# Patient Record
Sex: MALE | Race: WHITE | NOT HISPANIC OR LATINO | Employment: OTHER | URBAN - METROPOLITAN AREA
[De-identification: names, ages, dates, MRNs, and addresses within clinical notes are randomized per-mention and may not be internally consistent; named-entity substitution may affect disease eponyms.]

---

## 2019-07-09 ENCOUNTER — HOSPITAL ENCOUNTER (EMERGENCY)
Facility: HOSPITAL | Age: 34
Discharge: HOME | End: 2019-07-09
Attending: EMERGENCY MEDICINE | Admitting: EMERGENCY MEDICINE

## 2019-07-09 ENCOUNTER — APPOINTMENT (OUTPATIENT)
Dept: CT IMAGING | Facility: HOSPITAL | Age: 34
End: 2019-07-09

## 2019-07-09 VITALS
SYSTOLIC BLOOD PRESSURE: 143 MMHG | DIASTOLIC BLOOD PRESSURE: 75 MMHG | OXYGEN SATURATION: 96 % | HEART RATE: 62 BPM | HEIGHT: 67 IN | RESPIRATION RATE: 16 BRPM | TEMPERATURE: 98.3 F

## 2019-07-09 DIAGNOSIS — J36 PERITONSILLAR CELLULITIS: ICD-10-CM

## 2019-07-09 DIAGNOSIS — J03.90 ACUTE TONSILLITIS, UNSPECIFIED ETIOLOGY: Primary | ICD-10-CM

## 2019-07-09 LAB
ALBUMIN SERPL-MCNC: 4.2 G/DL (ref 3.5–5.2)
ALBUMIN/GLOB SERPL: 1.1 G/DL
ALP SERPL-CCNC: 73 U/L (ref 39–117)
ALT SERPL W P-5'-P-CCNC: 24 U/L (ref 1–41)
ANION GAP SERPL CALCULATED.3IONS-SCNC: 12.3 MMOL/L (ref 5–15)
AST SERPL-CCNC: 14 U/L (ref 1–40)
BASOPHILS # BLD AUTO: 0.04 10*3/MM3 (ref 0–0.2)
BASOPHILS NFR BLD AUTO: 0.3 % (ref 0–1.5)
BILIRUB SERPL-MCNC: 0.7 MG/DL (ref 0.2–1.2)
BUN BLD-MCNC: 13 MG/DL (ref 6–20)
BUN/CREAT SERPL: 10.9 (ref 7–25)
CALCIUM SPEC-SCNC: 9.8 MG/DL (ref 8.6–10.5)
CHLORIDE SERPL-SCNC: 100 MMOL/L (ref 98–107)
CO2 SERPL-SCNC: 25.7 MMOL/L (ref 22–29)
CREAT BLD-MCNC: 1.19 MG/DL (ref 0.76–1.27)
DEPRECATED RDW RBC AUTO: 34.7 FL (ref 37–54)
EOSINOPHIL # BLD AUTO: 0.07 10*3/MM3 (ref 0–0.4)
EOSINOPHIL NFR BLD AUTO: 0.4 % (ref 0.3–6.2)
ERYTHROCYTE [DISTWIDTH] IN BLOOD BY AUTOMATED COUNT: 11.3 % (ref 12.3–15.4)
GFR SERPL CREATININE-BSD FRML MDRD: 70 ML/MIN/1.73
GLOBULIN UR ELPH-MCNC: 3.8 GM/DL
GLUCOSE BLD-MCNC: 100 MG/DL (ref 65–99)
HCT VFR BLD AUTO: 42.8 % (ref 37.5–51)
HGB BLD-MCNC: 14.6 G/DL (ref 13–17.7)
IMM GRANULOCYTES # BLD AUTO: 0.11 10*3/MM3 (ref 0–0.05)
IMM GRANULOCYTES NFR BLD AUTO: 0.7 % (ref 0–0.5)
LYMPHOCYTES # BLD AUTO: 2.43 10*3/MM3 (ref 0.7–3.1)
LYMPHOCYTES NFR BLD AUTO: 15.6 % (ref 19.6–45.3)
MCH RBC QN AUTO: 29.3 PG (ref 26.6–33)
MCHC RBC AUTO-ENTMCNC: 34.1 G/DL (ref 31.5–35.7)
MCV RBC AUTO: 85.8 FL (ref 79–97)
MONOCYTES # BLD AUTO: 1.15 10*3/MM3 (ref 0.1–0.9)
MONOCYTES NFR BLD AUTO: 7.4 % (ref 5–12)
NEUTROPHILS # BLD AUTO: 11.76 10*3/MM3 (ref 1.7–7)
NEUTROPHILS NFR BLD AUTO: 75.6 % (ref 42.7–76)
NRBC BLD AUTO-RTO: 0 /100 WBC (ref 0–0.2)
PLATELET # BLD AUTO: 346 10*3/MM3 (ref 140–450)
PMV BLD AUTO: 9.4 FL (ref 6–12)
POTASSIUM BLD-SCNC: 4.3 MMOL/L (ref 3.5–5.2)
PROT SERPL-MCNC: 8 G/DL (ref 6–8.5)
RBC # BLD AUTO: 4.99 10*6/MM3 (ref 4.14–5.8)
SODIUM BLD-SCNC: 138 MMOL/L (ref 136–145)
WBC NRBC COR # BLD: 15.56 10*3/MM3 (ref 3.4–10.8)

## 2019-07-09 PROCEDURE — 96365 THER/PROPH/DIAG IV INF INIT: CPT

## 2019-07-09 PROCEDURE — 85025 COMPLETE CBC W/AUTO DIFF WBC: CPT | Performed by: EMERGENCY MEDICINE

## 2019-07-09 PROCEDURE — 80053 COMPREHEN METABOLIC PANEL: CPT | Performed by: EMERGENCY MEDICINE

## 2019-07-09 PROCEDURE — 25010000002 MORPHINE PER 10 MG: Performed by: EMERGENCY MEDICINE

## 2019-07-09 PROCEDURE — 25010000002 ONDANSETRON PER 1 MG: Performed by: EMERGENCY MEDICINE

## 2019-07-09 PROCEDURE — 96367 TX/PROPH/DG ADDL SEQ IV INF: CPT

## 2019-07-09 PROCEDURE — 96375 TX/PRO/DX INJ NEW DRUG ADDON: CPT

## 2019-07-09 PROCEDURE — 25010000002 IOPAMIDOL 61 % SOLUTION: Performed by: EMERGENCY MEDICINE

## 2019-07-09 PROCEDURE — 70491 CT SOFT TISSUE NECK W/DYE: CPT

## 2019-07-09 PROCEDURE — 25010000002 CEFTRIAXONE PER 250 MG: Performed by: EMERGENCY MEDICINE

## 2019-07-09 PROCEDURE — 25010000002 HYDROMORPHONE PER 4 MG: Performed by: EMERGENCY MEDICINE

## 2019-07-09 PROCEDURE — 99284 EMERGENCY DEPT VISIT MOD MDM: CPT

## 2019-07-09 PROCEDURE — 25010000002 DEXAMETHASONE PER 1 MG: Performed by: EMERGENCY MEDICINE

## 2019-07-09 RX ORDER — ONDANSETRON 2 MG/ML
4 INJECTION INTRAMUSCULAR; INTRAVENOUS ONCE
Status: COMPLETED | OUTPATIENT
Start: 2019-07-09 | End: 2019-07-09

## 2019-07-09 RX ORDER — CLINDAMYCIN HYDROCHLORIDE 300 MG/1
300 CAPSULE ORAL EVERY 6 HOURS
Qty: 40 CAPSULE | Refills: 0 | Status: SHIPPED | OUTPATIENT
Start: 2019-07-09 | End: 2021-04-30

## 2019-07-09 RX ORDER — CLINDAMYCIN PHOSPHATE 600 MG/50ML
600 INJECTION INTRAVENOUS ONCE
Status: COMPLETED | OUTPATIENT
Start: 2019-07-09 | End: 2019-07-09

## 2019-07-09 RX ORDER — SODIUM CHLORIDE 0.9 % (FLUSH) 0.9 %
10 SYRINGE (ML) INJECTION AS NEEDED
Status: DISCONTINUED | OUTPATIENT
Start: 2019-07-09 | End: 2019-07-10 | Stop reason: HOSPADM

## 2019-07-09 RX ORDER — CEFTRIAXONE SODIUM 1 G/50ML
1 INJECTION, SOLUTION INTRAVENOUS ONCE
Status: COMPLETED | OUTPATIENT
Start: 2019-07-09 | End: 2019-07-09

## 2019-07-09 RX ORDER — HYDROMORPHONE HYDROCHLORIDE 1 MG/ML
0.5 INJECTION, SOLUTION INTRAMUSCULAR; INTRAVENOUS; SUBCUTANEOUS ONCE
Status: COMPLETED | OUTPATIENT
Start: 2019-07-09 | End: 2019-07-09

## 2019-07-09 RX ORDER — MORPHINE SULFATE 2 MG/ML
4 INJECTION, SOLUTION INTRAMUSCULAR; INTRAVENOUS ONCE
Status: COMPLETED | OUTPATIENT
Start: 2019-07-09 | End: 2019-07-09

## 2019-07-09 RX ADMIN — CEFTRIAXONE SODIUM 1 G: 1 INJECTION, SOLUTION INTRAVENOUS at 20:12

## 2019-07-09 RX ADMIN — CLINDAMYCIN PHOSPHATE 600 MG: 600 INJECTION, SOLUTION INTRAVENOUS at 22:09

## 2019-07-09 RX ADMIN — DEXAMETHASONE SODIUM PHOSPHATE 10 MG: 10 INJECTION INTRAMUSCULAR; INTRAVENOUS at 20:41

## 2019-07-09 RX ADMIN — SODIUM CHLORIDE 500 ML: 9 INJECTION, SOLUTION INTRAVENOUS at 22:20

## 2019-07-09 RX ADMIN — HYDROMORPHONE HYDROCHLORIDE 0.5 MG: 1 INJECTION, SOLUTION INTRAMUSCULAR; INTRAVENOUS; SUBCUTANEOUS at 21:26

## 2019-07-09 RX ADMIN — ONDANSETRON HYDROCHLORIDE 4 MG: 2 SOLUTION INTRAMUSCULAR; INTRAVENOUS at 20:08

## 2019-07-09 RX ADMIN — MORPHINE SULFATE 4 MG: 2 INJECTION, SOLUTION INTRAMUSCULAR; INTRAVENOUS at 20:10

## 2019-07-09 RX ADMIN — IOPAMIDOL 75 ML: 612 INJECTION, SOLUTION INTRAVENOUS at 21:01

## 2019-07-09 NOTE — ED NOTES
Pt reports sore throat that began Tuesday night and was treated with zpack. Pt sent from [facility] for r/o peritonsillar abscess. Pt reports inc pain and swelling on the L side of his throat and difficulty talking.      Dulce Leung RN  07/09/19 0384

## 2019-07-09 NOTE — ED PROVIDER NOTES
" EMERGENCY DEPARTMENT ENCOUNTER    CHIEF COMPLAINT  Chief Complaint: Sore Throat   History given by: Patient  History limited by: none   Room Number: 01/01  PMD: Provider, No Known      HPI:  Pt is a 33 y.o. male who presents complaining of sore throat for the past 8 days, began as generalized and is now L sided throat pain. Pt states pain is exacerbated with any PO intake, talking, and opening mouth. Pt confirms intermittent fever with tmax of 101.9, loss of appetite, decreased PO intake, and \"muffled\" voice change. Pt states he developed L ear pain today. Pt denies cough and SOA, states he has been able to tolerate secretions. Per pt, upon onset of pain he took 3 days Zpak called in by family, but has not had any improvement. Pt states he has no hx of medical problems and no hx of smoking or drinking.     Duration:  8 days   Onset: gradual   Timing: constant   Location: generalized throat, L sided throat   Radiation: none   Quality: pain   Intensity/Severity: mild to moderate   Progression: worsening   Associated Symptoms: fever, decreased PO intake, loss of appetite, \"muffled\" voice change, ear pain  Aggravating Factors: talking, PO intake, opening mouth   Alleviating Factors: none   Previous Episodes: none   Treatment before arrival: Pt has take 3 day Zpak without improvement.     PAST MEDICAL HISTORY  Active Ambulatory Problems     Diagnosis Date Noted   • No Active Ambulatory Problems     Resolved Ambulatory Problems     Diagnosis Date Noted   • No Resolved Ambulatory Problems     No Additional Past Medical History       PAST SURGICAL HISTORY  No past surgical history on file.    FAMILY HISTORY  No family history on file.    SOCIAL HISTORY  Social History     Socioeconomic History   • Marital status:      Spouse name: Not on file   • Number of children: Not on file   • Years of education: Not on file   • Highest education level: Not on file       ALLERGIES  Patient has no known allergies.    REVIEW OF " "SYSTEMS  Review of Systems   Constitutional: Positive for activity change (decreased PO intake), appetite change (loss of ) and fever (tmax 101.9).   HENT: Positive for ear pain (L ), sore throat (generalized to L side) and voice change (\"muffled\"). Negative for congestion.    Eyes: Negative.    Respiratory: Negative for cough and shortness of breath.    Cardiovascular: Negative for chest pain and leg swelling.   Gastrointestinal: Negative for abdominal pain, diarrhea and vomiting.   Endocrine: Negative.    Genitourinary: Negative for decreased urine volume and dysuria.   Musculoskeletal: Negative for neck pain.   Skin: Negative for rash and wound.   Allergic/Immunologic: Negative.    Neurological: Negative for weakness, numbness and headaches.   Hematological: Negative.    Psychiatric/Behavioral: Negative.    All other systems reviewed and are negative.      PHYSICAL EXAM  ED Triage Vitals   Temp Heart Rate Resp BP SpO2   07/09/19 1909 07/09/19 1909 07/09/19 1909 07/09/19 1920 07/09/19 1909   98.3 °F (36.8 °C) 73 18 136/91 97 %      Temp src Heart Rate Source Patient Position BP Location FiO2 (%)   07/09/19 1909 07/09/19 1909 -- -- --   Tympanic Monitor          Physical Exam   Constitutional: He is oriented to person, place, and time. No distress.   HENT:   Head: Normocephalic and atraumatic.   Mouth/Throat: Uvula is midline. There is trismus in the jaw. Posterior oropharyngeal erythema present.   Bilateral cerumen obstructions. TMs unable to be visualized    L sided tonsillar swelling and edema around tonsil.    Eyes: EOM are normal. Pupils are equal, round, and reactive to light.   Neck: Normal range of motion. Neck supple.   Cardiovascular: Normal rate, regular rhythm and normal heart sounds.   No murmur heard.  Pulmonary/Chest: Effort normal and breath sounds normal. No respiratory distress.   Abdominal: Soft. Bowel sounds are normal. He exhibits no distension. There is no tenderness. There is no rebound and " no guarding.   Musculoskeletal: Normal range of motion. He exhibits no edema.   Lymphadenopathy:     He has cervical adenopathy (bilateral  anterior).   Neurological: He is alert and oriented to person, place, and time. He has normal sensation and normal strength.   Skin: Skin is warm and dry.   Psychiatric: Mood and affect normal.   Nursing note and vitals reviewed.      LAB RESULTS  Lab Results (last 24 hours)     Procedure Component Value Units Date/Time    POCT RAPID STREP A [[id number]]  (Normal) Collected:  07/09/19 1825     Updated:  07/09/19 1949     POC Strep A, Molecular Negative     QC Acceptable     Lot Number [id number]     Expiration Date 3/31/20     Comment --    CBC & Differential [[id number]] Collected:  07/09/19 2004    Specimen:  Blood Updated:  07/09/19 2019    Narrative:       The following orders were created for panel order CBC & Differential.  Procedure                               Abnormality         Status                     ---------                               -----------         ------                     CBC Auto Differential[[id number]]        Abnormal            Final result                 Please view results for these tests on the individual orders.    Comprehensive Metabolic Panel [[id number]]  (Abnormal) Collected:  07/09/19 2004    Specimen:  Blood Updated:  07/09/19 2035     Glucose 100 mg/dL      BUN 13 mg/dL      Creatinine 1.19 mg/dL      Sodium 138 mmol/L      Potassium 4.3 mmol/L      Chloride 100 mmol/L      CO2 25.7 mmol/L      Calcium 9.8 mg/dL      Total Protein 8.0 g/dL      Albumin 4.20 g/dL      ALT (SGPT) 24 U/L      AST (SGOT) 14 U/L      Alkaline Phosphatase 73 U/L      Total Bilirubin 0.7 mg/dL      eGFR Non African Amer 70 mL/min/1.73      Globulin 3.8 gm/dL      A/G Ratio 1.1 g/dL      BUN/Creatinine Ratio 10.9     Anion Gap 12.3 mmol/L     Narrative:       GFR Normal >60  Chronic Kidney Disease <60  Kidney Failure <15    CBC Auto Differential [[id number]]   (Abnormal) Collected:  07/09/19 2004    Specimen:  Blood Updated:  07/09/19 2019     WBC 15.56 10*3/mm3      RBC 4.99 10*6/mm3      Hemoglobin 14.6 g/dL      Hematocrit 42.8 %      MCV 85.8 fL      MCH 29.3 pg      MCHC 34.1 g/dL      RDW 11.3 %      RDW-SD 34.7 fl      MPV 9.4 fL      Platelets 346 10*3/mm3      Neutrophil % 75.6 %      Lymphocyte % 15.6 %      Monocyte % 7.4 %      Eosinophil % 0.4 %      Basophil % 0.3 %      Immature Grans % 0.7 %      Neutrophils, Absolute 11.76 10*3/mm3      Lymphocytes, Absolute 2.43 10*3/mm3      Monocytes, Absolute 1.15 10*3/mm3      Eosinophils, Absolute 0.07 10*3/mm3      Basophils, Absolute 0.04 10*3/mm3      Immature Grans, Absolute 0.11 10*3/mm3      nRBC 0.0 /100 WBC           I ordered the above labs and reviewed the results    RADIOLOGY  CT Soft Tissue Neck With Contrast   Final Result   1. Left palatine tonsillar enlargement with subtle area of diminished   attenuation centrally felt to reflect inflammatory phlegmon at this time   without convincing evidence of a well-formed abscess.   2. Mild right jugulodigastric adenopathy favored to be reactive               This report was finalized on 7/9/2019 9:21 PM by Viet Palma M.D.               I ordered the above noted radiological studies. Interpreted by radiologist. Reviewed by me in PACS.       PROCEDURES  Procedures      PROGRESS AND CONSULTS     1958: Upon pt exam, discussed plan for labs and CT neck for further evaluation and rule out of abscess. Informed pt of plan for abx, pain medication, and steroids in ED.     2001: Labs and CT soft tissue neck ordered for further evaluation. Zofran, decadron, rocephin, and morphine ordered for management of symptoms.     2122: Dilaudid ordered for further pain management.     2135: Call placed to ENT.     2137: Pt rechecked and resting comfortably. Discussed results of labs and CT scan with evidence of elevated WBC and evidence of phlegmon seen on CT. Informed pt of  plan for discussion with ENT for further plan for care, with probable plan for discharge with further abx treatment. Discussed pt's Penicillin allergy at length, with plan for Clindamycin and pain medication upon discharge.     2156: Discussed pt's case with Dr. Magdaleno (ENT) who recommended giving pt dosage of Clindamycin in ED prior to discharge with further Clindamycin for outpatient follow up in his office tomorrow.     2157: Clindamycin ordered for dosage in ED prior to discharge.     2205: Pt rechecked and resting comfortably. Discussed call with Dr. Magdaleno and plan for discharge following dosage of Clindamycin in ED, with plan for outpatient office follow up at ENT tomorrow at 1300. Agreed to give pt further abx and pain medication with discharge as prior discussed. Pt understands and agrees with the plan, all questions answered.      MEDICAL DECISION MAKING  Results were reviewed/discussed with the patient and they were also made aware of online access. Pt also made aware that some labs, such as cultures, will not be resulted during ER visit and follow up with PMD is necessary.     MDM  Number of Diagnoses or Management Options     Amount and/or Complexity of Data Reviewed  Clinical lab tests: ordered and reviewed (WBC -  15.56)  Tests in the radiology section of CPT®: ordered and reviewed (CT- L palatine tonsillar enlargement with phlegmon )  Review and summarize past medical records: yes (7/9/19 - seen by Dr. Rodriguez (PCP) and referred to ED for abscess rule out )  Discuss the patient with other providers: yes (Dr. Magdaleno (ENT))           DIAGNOSIS  Final diagnoses:   Acute tonsillitis, unspecified etiology   Peritonsillar cellulitis       DISPOSITION  DISCHARGE    Patient discharged in stable condition.    Reviewed implications of results, diagnosis, meds, responsibility to follow up, warning signs and symptoms of possible worsening, potential complications and reasons to return to ER.    Patient/Family  voiced understanding of above instructions.    Discussed plan for discharge, as there is no emergent indication for admission. Patient referred to primary care provider for BP management due to today's BP. Pt/family is agreeable and understands need for follow up and repeat testing.  Pt is aware that discharge does not mean that nothing is wrong but it indicates no emergency is present that requires admission and they must continue care with follow-up as given below or physician of their choice.     FOLLOW-UP  Salomon Magdaleno MD  [address] [address]  [phone]    In 1 day           Medication List      New Prescriptions    clindamycin 300 MG capsule  Commonly known as:  CLEOCIN  Take 1 capsule by mouth Every 6 (Six) Hours.     HYDROcodone-acetaminophen 7.5-325 MG/15ML solution  Commonly known as:  HYCET  Take 15 mL by mouth Every 6 (Six) Hours As Needed for Moderate Pain .              Latest Documented Vital Signs:  As of 10:49 PM  BP- 143/75 HR- 62 Temp- 98.3 °F (36.8 °C) (Tympanic) O2 sat- 96%    --  Documentation assistance provided by tasia Solomon for Dr. Hanson.  Information recorded by the scribe was done at my direction and has been verified and validated by me.          Vanda Solomon  07/09/19 1749       Sid Hanson MD  07/09/19 2799

## 2019-07-09 NOTE — ED NOTES
"Pt reports sore throat began last week. He began taking Zpack with no improvement. Pt now having L sided swelling of neck and L ear pain that began today. Pt reports increased pain with swallowing but states he can do it but \"would rather spit out\"       Elodia Dangelo, RN  07/09/19 1956    "

## 2021-04-30 ENCOUNTER — OFFICE VISIT (OUTPATIENT)
Dept: FAMILY MEDICINE CLINIC | Facility: CLINIC | Age: 36
End: 2021-04-30

## 2021-04-30 VITALS
BODY MASS INDEX: 34.97 KG/M2 | SYSTOLIC BLOOD PRESSURE: 123 MMHG | OXYGEN SATURATION: 95 % | TEMPERATURE: 98.7 F | DIASTOLIC BLOOD PRESSURE: 74 MMHG | WEIGHT: 222.8 LBS | HEART RATE: 60 BPM | HEIGHT: 67 IN

## 2021-04-30 DIAGNOSIS — Z00.00 HEALTH MAINTENANCE EXAMINATION: Primary | ICD-10-CM

## 2021-04-30 DIAGNOSIS — R53.83 FATIGUE, UNSPECIFIED TYPE: ICD-10-CM

## 2021-04-30 DIAGNOSIS — G47.00 INSOMNIA, UNSPECIFIED TYPE: ICD-10-CM

## 2021-04-30 PROCEDURE — 99385 PREV VISIT NEW AGE 18-39: CPT | Performed by: NURSE PRACTITIONER

## 2021-04-30 PROCEDURE — 3008F BODY MASS INDEX DOCD: CPT | Performed by: NURSE PRACTITIONER

## 2021-04-30 RX ORDER — AMITRIPTYLINE HYDROCHLORIDE 25 MG/1
25 TABLET, FILM COATED ORAL NIGHTLY PRN
Qty: 30 TABLET | Refills: 1 | Status: SHIPPED | OUTPATIENT
Start: 2021-04-30 | End: 2022-04-29

## 2021-05-03 LAB
25(OH)D3+25(OH)D2 SERPL-MCNC: 32 NG/ML (ref 30–100)
BASOPHILS # BLD AUTO: 0.02 10*3/MM3 (ref 0–0.2)
BASOPHILS NFR BLD AUTO: 0.3 % (ref 0–1.5)
EOSINOPHIL # BLD AUTO: 0.09 10*3/MM3 (ref 0–0.4)
EOSINOPHIL NFR BLD AUTO: 1.4 % (ref 0.3–6.2)
ERYTHROCYTE [DISTWIDTH] IN BLOOD BY AUTOMATED COUNT: 11.7 % (ref 12.3–15.4)
HCT VFR BLD AUTO: 42.1 % (ref 37.5–51)
HGB BLD-MCNC: 14.5 G/DL (ref 13–17.7)
IMM GRANULOCYTES # BLD AUTO: 0.02 10*3/MM3 (ref 0–0.05)
IMM GRANULOCYTES NFR BLD AUTO: 0.3 % (ref 0–0.5)
LYMPHOCYTES # BLD AUTO: 2.27 10*3/MM3 (ref 0.7–3.1)
LYMPHOCYTES NFR BLD AUTO: 36.4 % (ref 19.6–45.3)
MCH RBC QN AUTO: 30 PG (ref 26.6–33)
MCHC RBC AUTO-ENTMCNC: 34.4 G/DL (ref 31.5–35.7)
MCV RBC AUTO: 87 FL (ref 79–97)
MONOCYTES # BLD AUTO: 0.36 10*3/MM3 (ref 0.1–0.9)
MONOCYTES NFR BLD AUTO: 5.8 % (ref 5–12)
NEUTROPHILS # BLD AUTO: 3.48 10*3/MM3 (ref 1.7–7)
NEUTROPHILS NFR BLD AUTO: 55.8 % (ref 42.7–76)
NRBC BLD AUTO-RTO: 0 /100 WBC (ref 0–0.2)
PLATELET # BLD AUTO: 241 10*3/MM3 (ref 140–450)
RBC # BLD AUTO: 4.84 10*6/MM3 (ref 4.14–5.8)
TESTOST FREE SERPL-MCNC: 6.4 PG/ML (ref 8.7–25.1)
TESTOST SERPL-MCNC: 447 NG/DL (ref 264–916)
TSH SERPL DL<=0.005 MIU/L-ACNC: 1.57 UIU/ML (ref 0.27–4.2)
WBC # BLD AUTO: 6.24 10*3/MM3 (ref 3.4–10.8)

## 2021-05-13 ENCOUNTER — TELEPHONE (OUTPATIENT)
Dept: FAMILY MEDICINE CLINIC | Facility: CLINIC | Age: 36
End: 2021-05-13

## 2021-05-13 NOTE — TELEPHONE ENCOUNTER
OK for HUB to read and schedule:    LMTCB-  Your provider will be out of the office 05/14/2021. Please call office to reschedule your appointment that is on 05/14/21

## 2022-04-01 ENCOUNTER — APPOINTMENT (OUTPATIENT)
Dept: CARDIOLOGY | Facility: HOSPITAL | Age: 37
End: 2022-04-01

## 2022-04-01 ENCOUNTER — HOSPITAL ENCOUNTER (EMERGENCY)
Facility: HOSPITAL | Age: 37
Discharge: HOME | End: 2022-04-01
Attending: EMERGENCY MEDICINE | Admitting: EMERGENCY MEDICINE

## 2022-04-01 VITALS
DIASTOLIC BLOOD PRESSURE: 83 MMHG | HEIGHT: 67 IN | RESPIRATION RATE: 16 BRPM | SYSTOLIC BLOOD PRESSURE: 135 MMHG | WEIGHT: 270 LBS | OXYGEN SATURATION: 98 % | BODY MASS INDEX: 42.38 KG/M2 | HEART RATE: 51 BPM | TEMPERATURE: 97.2 F

## 2022-04-01 DIAGNOSIS — I82.462 ACUTE DEEP VEIN THROMBOSIS (DVT) OF CALF MUSCLE VEIN OF LEFT LOWER EXTREMITY: Primary | ICD-10-CM

## 2022-04-01 LAB
BH CV LOW VAS LEFT GASTRONEMIUS VESSEL: 1
BH CV LOW VAS LEFT POSTERIOR TIBIAL VESSEL: 1
BH CV LOW VAS LEFT SOLEAL VESSEL: 1
BH CV LOWER VASCULAR LEFT COMMON FEMORAL AUGMENT: NORMAL
BH CV LOWER VASCULAR LEFT COMMON FEMORAL COMPETENT: NORMAL
BH CV LOWER VASCULAR LEFT COMMON FEMORAL COMPRESS: NORMAL
BH CV LOWER VASCULAR LEFT COMMON FEMORAL PHASIC: NORMAL
BH CV LOWER VASCULAR LEFT COMMON FEMORAL SPONT: NORMAL
BH CV LOWER VASCULAR LEFT DISTAL FEMORAL COMPRESS: NORMAL
BH CV LOWER VASCULAR LEFT GASTRONEMIUS COMPRESS: NORMAL
BH CV LOWER VASCULAR LEFT GASTRONEMIUS THROMBUS: NORMAL
BH CV LOWER VASCULAR LEFT GREATER SAPH AK COMPRESS: NORMAL
BH CV LOWER VASCULAR LEFT GREATER SAPH BK COMPRESS: NORMAL
BH CV LOWER VASCULAR LEFT LESSER SAPH COMPRESS: NORMAL
BH CV LOWER VASCULAR LEFT MID FEMORAL AUGMENT: NORMAL
BH CV LOWER VASCULAR LEFT MID FEMORAL COMPETENT: NORMAL
BH CV LOWER VASCULAR LEFT MID FEMORAL COMPRESS: NORMAL
BH CV LOWER VASCULAR LEFT MID FEMORAL PHASIC: NORMAL
BH CV LOWER VASCULAR LEFT MID FEMORAL SPONT: NORMAL
BH CV LOWER VASCULAR LEFT PERONEAL COMPRESS: NORMAL
BH CV LOWER VASCULAR LEFT POPLITEAL AUGMENT: NORMAL
BH CV LOWER VASCULAR LEFT POPLITEAL COMPETENT: NORMAL
BH CV LOWER VASCULAR LEFT POPLITEAL COMPRESS: NORMAL
BH CV LOWER VASCULAR LEFT POPLITEAL PHASIC: NORMAL
BH CV LOWER VASCULAR LEFT POPLITEAL SPONT: NORMAL
BH CV LOWER VASCULAR LEFT POSTERIOR TIBIAL COMPRESS: NORMAL
BH CV LOWER VASCULAR LEFT POSTERIOR TIBIAL THROMBUS: NORMAL
BH CV LOWER VASCULAR LEFT PROFUNDA FEMORAL COMPRESS: NORMAL
BH CV LOWER VASCULAR LEFT PROXIMAL FEMORAL COMPRESS: NORMAL
BH CV LOWER VASCULAR LEFT SAPHENOFEMORAL JUNCTION COMPRESS: NORMAL
BH CV LOWER VASCULAR LEFT SOLEAL COMPRESS: NORMAL
BH CV LOWER VASCULAR LEFT SOLEAL THROMBUS: NORMAL
BH CV LOWER VASCULAR RIGHT COMMON FEMORAL AUGMENT: NORMAL
BH CV LOWER VASCULAR RIGHT COMMON FEMORAL COMPETENT: NORMAL
BH CV LOWER VASCULAR RIGHT COMMON FEMORAL COMPRESS: NORMAL
BH CV LOWER VASCULAR RIGHT COMMON FEMORAL PHASIC: NORMAL
BH CV LOWER VASCULAR RIGHT COMMON FEMORAL SPONT: NORMAL
MAXIMAL PREDICTED HEART RATE: 184 BPM
STRESS TARGET HR: 156 BPM

## 2022-04-01 PROCEDURE — 99282 EMERGENCY DEPT VISIT SF MDM: CPT

## 2022-04-01 PROCEDURE — 93971 EXTREMITY STUDY: CPT

## 2022-04-01 NOTE — ED PROVIDER NOTES
MD ATTESTATION NOTE    The BERTHA and I have discussed this patient's history, physical exam, and treatment plan.  I have reviewed the documentation and personally had a face to face interaction with the patient. I affirm the documentation and agree with the treatment and plan.  The attached note describes my personal findings.    I provided a substantive portion of the care of this patient. I personally performed the physical exam, in its entirety.    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  He did have recent car ride to [address] but denies any other injury.  Denies chest pain or shortness of breath.    Physical Exam  Vital Signs reviewed  GENERAL: Alert male no obvious distress  HENT: nares patent  EYES: no scleral icterus  CV: regular rhythm, regular rate  RESPIRATORY: normal effort, clear to auscultation bilaterally  ABDOMEN: soft, nontender  MUSCULOSKELETAL: no deformity-mild tenderness palpation of left calf-no significant swelling noted  NEURO: Strength sensation and coordination are grossly intact.  Speech and mentation are unremarkable  SKIN: warm, dry      Disposition  I discussed treatment and evaluation this patient with JESUS Osuna.  Doppler ultrasound unfortunately did show calf vein DVT.  We will go ahead and initiate anticoagulation with oral Eliquis.  Patient did have blood work within the last year that was pretty normal including normal hemoglobin and renal function.  I see no indication to repeat labs at this time.  Will give patient referral to either primary care provider or hematology for further work-up as outpatient.       Sami Swain MD  04/01/22 3009

## 2022-04-01 NOTE — PROGRESS NOTES
Today's preliminary report. Left lower extremity venous Doppler is positive for acute calf DVT. Report called to JESUS Winn

## 2022-04-01 NOTE — ED PROVIDER NOTES
EMERGENCY DEPARTMENT ENCOUNTER    Room Number:  B01/01  Date of encounter:  4/1/2022  PCP: Robyn Nevarez APRN  Historian: Patient      I used full protective equipment while examining this patient.  This includes face mask, gloves and protective eyewear.  I washed my hands before entering the room and immediately upon leaving the room      HPI:  Chief Complaint: Left calf pain  A complete HPI/ROS/PMH/PSH/SH/FH are unobtainable due to: Nothing    Context: Ruddy Luu is a 36 y.o. male who presents to the ED c/o gradual onset left calf pain yesterday.  Patient describes it as an achy, sharp sensation at the mid posterior left calf.  He denies any known trauma.  He did have a 4-hour car ride to [place] 2 days ago and returned via car yesterday.  He denies any chest pain, shortness of breath.  Denies any prior history of DVTs.  Denies any hormone therapy.  He states that ambulating worsens the pain.  He denies any numbness distally in his foot.    Review of Medical Records  I reviewed patient's last family medicine office visit from 4/30/2021.  Patient being followed for general health maintenance.    PAST MEDICAL HISTORY  Active Ambulatory Problems     Diagnosis Date Noted   • No Active Ambulatory Problems     Resolved Ambulatory Problems     Diagnosis Date Noted   • No Resolved Ambulatory Problems     No Additional Past Medical History         PAST SURGICAL HISTORY  History reviewed. No pertinent surgical history.      FAMILY HISTORY  History reviewed. No pertinent family history.      SOCIAL HISTORY  Social History     Socioeconomic History   • Marital status:    Tobacco Use   • Smoking status: Never Smoker   • Smokeless tobacco: Never Used   Substance and Sexual Activity   • Alcohol use: Yes     Comment: rarely   • Drug use: Never         ALLERGIES  Penicillins        REVIEW OF SYSTEMS  All systems reviewed and negative except for those discussed in HPI.       PHYSICAL EXAM    I have reviewed the  triage vital signs and nursing notes.    ED Triage Vitals   Temp Heart Rate Resp BP SpO2   04/01/22 1144 04/01/22 1144 04/01/22 1144 04/01/22 1159 04/01/22 1144   97.2 °F (36.2 °C) 69 18 142/75 96 %      Temp src Heart Rate Source Patient Position BP Location FiO2 (%)   04/01/22 1144 04/01/22 1144 04/01/22 1159 04/01/22 1159 --   Tympanic Monitor Sitting Left arm        Physical Exam  GENERAL: Alert, oriented, not distressed  HENT: head atraumatic, no nuchal rigidity  EYES: no scleral icterus, EOMI  CV: regular rhythm, regular rate, no murmur  RESPIRATORY: normal effort, CTA  ABDOMEN: soft, nontender  MUSCULOSKELETAL: Mild diffuse tenderness to posterior left calf.  No palpable cords.  Positive Homans' sign.  Compartments are soft.  Neurovascularly intact distally.  NEURO: alert, moves all extremities, follows commands  SKIN: warm, dry        LAB RESULTS  Recent Results (from the past 24 hour(s))   Duplex Venous Lower Extremity - Left CAR    Collection Time: 04/01/22  1:35 PM   Result Value Ref Range    Target HR (85%) 156 bpm    Max. Pred. HR (100%) 184 bpm    Left Posterior Tibial Vessel 1     Left Gastronemius Vessel 1     Right Common Femoral Spont Y     Right Common Femoral Phasic Y     Right Common Femoral Augment Y     Right Common Femoral Competent Y     Right Common Femoral Compress C     Left Posterior Tibial Compress N     Left Posterior Tibial Thrombus A     Left Gastronemius Compress N     Left Gastronemius Thrombus A     Lt soleal vessel 1     Lt soleal compress N     Lt soleal thrombus A     Left Common Femoral Spont Y     Left Common Femoral Phasic Y     Left Common Femoral Augment Y     Left Common Femoral Competent Y     Left Common Femoral Compress C     Left Saphenofemoral Junction Compress C     Left Profunda Femoral Compress C     Left Proximal Femoral Compress C     Left Mid Femoral Spont Y     Left Mid Femoral Phasic Y     Left Mid Femoral Augment Y     Left Mid Femoral Competent Y     Left  Mid Femoral Compress C     Left Distal Femoral Compress C     Left Popliteal Spont Y     Left Popliteal Phasic Y     Left Popliteal Augment Y     Left Popliteal Competent Y     Left Popliteal Compress C     Left Peroneal Compress C     Left Greater Saph AK Compress C     Left Greater Saph BK Compress C     Left Lesser Saph Compress C        Ordered the above labs and independently reviewed the results.        RADIOLOGY  Duplex Venous Lower Extremity - Left CAR    Addendum Date: 4/1/2022    · Acute left lower extremity deep vein thrombosis noted in the posterial tibial, gastrocnemius and soleal. · All other left sided veins appeared normal.      Result Date: 4/1/2022  · Acute left lower extremity deep vein thrombosis noted in the posterial tibial and gastrocnemius. · Acute left lower extremity superficial thrombophlebitis noted in the great saphenous (above knee). · All other left sided veins appeared normal.        I ordered the above noted radiological studies. Reviewed by me and discussed with radiologist.  See dictation for official radiology interpretation.      MEDICATIONS GIVEN IN ER    Medications - No data to display      PROGRESS, DATA ANALYSIS, CONSULTS, AND MEDICAL DECISION MAKING    All labs have been independently reviewed by me.  All radiology studies have been reviewed by me and discussed with radiologist dictating the report.   EKG's independently viewed and interpreted by me.  Discussion below represents my analysis of pertinent findings related to patient's condition, differential diagnosis, treatment plan and final disposition.    I have discussed case with Dr. Swain, emergency room physician.  He has performed his own bedside examination and agrees with treatment plan.    ED Course as of 04/01/22 1851 Fri Apr 01, 2022   1207 Patient presents with 1 day history of left calf pain and swelling.  Differential diagnoses include but not limited to muscular strain, DVT, superficial thrombophlebitis.  [EE]   1346 I discussed ultrasound findings with technician.  Patient does have acute calf vein DVT.      He denies any chest pain, shortness of breath he has stable vitals.  No evidence of PE.  Plan to start the patient on Eliquis. [EE]      ED Course User Index  [EE] Lico Osuna PA       AS OF 18:51 EDT VITALS:    BP - 135/83  HR - 51  TEMP - 97.2 °F (36.2 °C) (Tympanic)  O2 SATS - 98%        DIAGNOSIS  Final diagnoses:   Acute deep vein thrombosis (DVT) of calf muscle vein of left lower extremity (HCC)         DISPOSITION  Discharged      Dictated utilizing Dragon dictation     Lico Osuna PA  04/01/22 0543

## 2022-04-04 ENCOUNTER — TELEPHONE (OUTPATIENT)
Dept: FAMILY MEDICINE CLINIC | Facility: CLINIC | Age: 37
End: 2022-04-04

## 2022-04-04 ENCOUNTER — TELEPHONE (OUTPATIENT)
Dept: ONCOLOGY | Facility: CLINIC | Age: 37
End: 2022-04-04

## 2022-04-04 NOTE — TELEPHONE ENCOUNTER
Pt is new tomorrow, was in ER over the weekend with blood clot.  He is in a lot of pain and can't walk on that leg.  Wants to make sure it's ok to wait till tomorrow or should he go back to ER.  He has been on Eliquis for 2 days.

## 2022-04-04 NOTE — TELEPHONE ENCOUNTER
Caller: jonh ralph    Relationship: Emergency Contact    Best call back number: [phone]    What is the best time to reach you: ANYTIME    Who are you requesting to speak with (clinical staff, provider,  specific staff member): DR KANG OR NURSE    What was the call regarding: JONH CALLED - STATED PT WAS SEEN IN THE ER OVER THE WEEKEND FOR A BLOOD CLOT. HE HAS BEEN STARTED ON ELIQUIS. HE IS STILL HAVING PAIN AND SWELLING AND THEY WERE WANTING HIM TO BE SEEN ASAP. THE ER TOLD THEM TO F/U WITH OUR OFFICE.    PLEASE CALL ASAP TO ADVISE.    SPOKE WITH GABINO ON CLINICAL LINE AND SHE SAID SEND OVER URGENT ENCOUNTER.     Do you require a callback: YES

## 2022-04-04 NOTE — TELEPHONE ENCOUNTER
Returned call to wife who is reporting that patient is experiencing severe pain in his leg where the clot is.  There is swelling, pain and redness still in leg.  If he puts weight on his foot to walk he has severe pain.  He has a new patient appointment with [facility] at 0840 in the am.  I asked wife if the leg has increased in size or is discolored.  She reports redness only.  I asked her if he has a pulse in his foot, but she is not home to check it at this time.  I explained that if worsens he will need to go back to the ER.

## 2022-04-04 NOTE — TELEPHONE ENCOUNTER
Returned call to patient's wife who is calling to make an urgent appointment with Hematology/oncology.  See ER referral.  He is supposed to be seen this week.  I will send to  to contact wife for appointment.  He will continue to elevate and take his Eliquis as ordered.

## 2022-04-05 ENCOUNTER — LAB (OUTPATIENT)
Dept: OTHER | Facility: HOSPITAL | Age: 37
End: 2022-04-05

## 2022-04-05 ENCOUNTER — CONSULT (OUTPATIENT)
Dept: ONCOLOGY | Facility: CLINIC | Age: 37
End: 2022-04-05

## 2022-04-05 VITALS
TEMPERATURE: 97.5 F | RESPIRATION RATE: 16 BRPM | BODY MASS INDEX: 41.64 KG/M2 | HEART RATE: 61 BPM | DIASTOLIC BLOOD PRESSURE: 72 MMHG | WEIGHT: 265.3 LBS | OXYGEN SATURATION: 96 % | SYSTOLIC BLOOD PRESSURE: 128 MMHG | HEIGHT: 67 IN

## 2022-04-05 DIAGNOSIS — I82.462 ACUTE DEEP VEIN THROMBOSIS (DVT) OF CALF MUSCLE VEIN OF LEFT LOWER EXTREMITY: Primary | ICD-10-CM

## 2022-04-05 DIAGNOSIS — Z86.718 H/O BLOOD CLOTS: Primary | ICD-10-CM

## 2022-04-05 DIAGNOSIS — I82.4Y9 ACUTE DEEP VEIN THROMBOSIS (DVT) OF PROXIMAL VEIN OF LOWER EXTREMITY, UNSPECIFIED LATERALITY: Primary | ICD-10-CM

## 2022-04-05 LAB
ALBUMIN SERPL-MCNC: 4.5 G/DL (ref 3.5–5.2)
ALBUMIN/GLOB SERPL: 1.5 G/DL
ALP SERPL-CCNC: 84 U/L (ref 39–117)
ALT SERPL W P-5'-P-CCNC: 48 U/L (ref 1–41)
ANION GAP SERPL CALCULATED.3IONS-SCNC: 9.5 MMOL/L (ref 5–15)
AST SERPL-CCNC: 24 U/L (ref 1–40)
BASOPHILS # BLD AUTO: 0.04 10*3/MM3 (ref 0–0.2)
BASOPHILS NFR BLD AUTO: 0.5 % (ref 0–1.5)
BILIRUB SERPL-MCNC: 0.3 MG/DL (ref 0–1.2)
BUN SERPL-MCNC: 15 MG/DL (ref 6–20)
BUN/CREAT SERPL: 12.6 (ref 7–25)
CALCIUM SPEC-SCNC: 10.2 MG/DL (ref 8.6–10.5)
CHLORIDE SERPL-SCNC: 103 MMOL/L (ref 98–107)
CO2 SERPL-SCNC: 24.5 MMOL/L (ref 22–29)
CREAT SERPL-MCNC: 1.19 MG/DL (ref 0.76–1.27)
DEPRECATED RDW RBC AUTO: 35.6 FL (ref 37–54)
EGFRCR SERPLBLD CKD-EPI 2021: 81.2 ML/MIN/1.73
EOSINOPHIL # BLD AUTO: 0.21 10*3/MM3 (ref 0–0.4)
EOSINOPHIL NFR BLD AUTO: 2.5 % (ref 0.3–6.2)
ERYTHROCYTE [DISTWIDTH] IN BLOOD BY AUTOMATED COUNT: 11.9 % (ref 12.3–15.4)
GLOBULIN UR ELPH-MCNC: 3 GM/DL
GLUCOSE SERPL-MCNC: 95 MG/DL (ref 65–99)
HCT VFR BLD AUTO: 43.2 % (ref 37.5–51)
HGB BLD-MCNC: 15.2 G/DL (ref 13–17.7)
IMM GRANULOCYTES # BLD AUTO: 0.04 10*3/MM3 (ref 0–0.05)
IMM GRANULOCYTES NFR BLD AUTO: 0.5 % (ref 0–0.5)
LYMPHOCYTES # BLD AUTO: 2.43 10*3/MM3 (ref 0.7–3.1)
LYMPHOCYTES NFR BLD AUTO: 28.5 % (ref 19.6–45.3)
MCH RBC QN AUTO: 29.2 PG (ref 26.6–33)
MCHC RBC AUTO-ENTMCNC: 35.2 G/DL (ref 31.5–35.7)
MCV RBC AUTO: 82.9 FL (ref 79–97)
MONOCYTES # BLD AUTO: 0.46 10*3/MM3 (ref 0.1–0.9)
MONOCYTES NFR BLD AUTO: 5.4 % (ref 5–12)
NEUTROPHILS NFR BLD AUTO: 5.34 10*3/MM3 (ref 1.7–7)
NEUTROPHILS NFR BLD AUTO: 62.6 % (ref 42.7–76)
NRBC BLD AUTO-RTO: 0 /100 WBC (ref 0–0.2)
PLATELET # BLD AUTO: 274 10*3/MM3 (ref 140–450)
PMV BLD AUTO: 10.1 FL (ref 6–12)
POTASSIUM SERPL-SCNC: 4.2 MMOL/L (ref 3.5–5.2)
PROT SERPL-MCNC: 7.5 G/DL (ref 6–8.5)
RBC # BLD AUTO: 5.21 10*6/MM3 (ref 4.14–5.8)
SODIUM SERPL-SCNC: 137 MMOL/L (ref 136–145)
WBC NRBC COR # BLD: 8.52 10*3/MM3 (ref 3.4–10.8)

## 2022-04-05 PROCEDURE — 85732 THROMBOPLASTIN TIME PARTIAL: CPT | Performed by: INTERNAL MEDICINE

## 2022-04-05 PROCEDURE — 85303 CLOT INHIBIT PROT C ACTIVITY: CPT | Performed by: INTERNAL MEDICINE

## 2022-04-05 PROCEDURE — 86146 BETA-2 GLYCOPROTEIN ANTIBODY: CPT | Performed by: INTERNAL MEDICINE

## 2022-04-05 PROCEDURE — 36415 COLL VENOUS BLD VENIPUNCTURE: CPT

## 2022-04-05 PROCEDURE — 80053 COMPREHEN METABOLIC PANEL: CPT | Performed by: INTERNAL MEDICINE

## 2022-04-05 PROCEDURE — 85306 CLOT INHIBIT PROT S FREE: CPT | Performed by: INTERNAL MEDICINE

## 2022-04-05 PROCEDURE — 85300 ANTITHROMBIN III ACTIVITY: CPT | Performed by: INTERNAL MEDICINE

## 2022-04-05 PROCEDURE — 99204 OFFICE O/P NEW MOD 45 MIN: CPT | Performed by: INTERNAL MEDICINE

## 2022-04-05 PROCEDURE — 85613 RUSSELL VIPER VENOM DILUTED: CPT | Performed by: INTERNAL MEDICINE

## 2022-04-05 PROCEDURE — 81240 F2 GENE: CPT | Performed by: INTERNAL MEDICINE

## 2022-04-05 PROCEDURE — 85025 COMPLETE CBC W/AUTO DIFF WBC: CPT | Performed by: INTERNAL MEDICINE

## 2022-04-05 PROCEDURE — 86147 CARDIOLIPIN ANTIBODY EA IG: CPT | Performed by: INTERNAL MEDICINE

## 2022-04-05 PROCEDURE — 81241 F5 GENE: CPT | Performed by: INTERNAL MEDICINE

## 2022-04-05 RX ORDER — BUPROPION HYDROCHLORIDE 300 MG/1
TABLET ORAL
COMMUNITY
Start: 2021-12-01 | End: 2022-04-29 | Stop reason: SDUPTHER

## 2022-04-05 RX ORDER — BUPROPION HYDROCHLORIDE 300 MG/1
TABLET ORAL
COMMUNITY
Start: 2022-04-02

## 2022-04-05 NOTE — PROGRESS NOTES
Subjective     REASON FOR CONSULTATION: Left lower extremity calf vein DVT  Provide an opinion on any further workup or treatment                             REQUESTING PHYSICIAN: SHIRA Montalvo    RECORDS OBTAINED:  Records of the patients history including those obtained from the referring provider were reviewed and summarized in detail.    HISTORY OF PRESENT ILLNESS:  The patient is a 36 y.o. year old male who is here for an opinion about the above issue.  He is a relatively healthy young man with no chronic medical problems.  He presented to the emergency room on 4/1/2022 with pain in the left lower extremity.  He underwent a venous Doppler study acute DVT in the posterior tibial, gastrocnemius, and soleal veins.  He was started on Eliquis 10 mg p.o. twice daily x7 days to be followed by 5 mg twice daily thereafter.    He is here today for his first visit to our office and to undergo a hypercoagulable work-up to try to explain the development of this clot.  He does have a family history of blood clots in his mother who had DVTs.  He has not had any significant injury or surgery on the leg.  He had recently traveled to [place] in a car but had gotten out every couple of hours.  He is not on any hormonal therapies.  He has no history of malignancy.    He is not having any signs or symptoms of pulmonary embolus.  He reports the leg is still sore and swollen although it is better since starting Eliquis.  I believe he needs to have some compression stockings along with his anticoagulation.    History of Present Illness     Past Medical History:   Diagnosis Date   • History of DVT (deep vein thrombosis) 04/2022    Left lower extremity, calf muscle vein   • No pertinent past medical history         No past surgical history on file.     Current Outpatient Medications on File Prior to Visit   Medication Sig Dispense Refill   • Apixaban Starter Pack tablet therapy pack Take two 5 mg tablets by mouth every 12 hours  "for 7 days. Followed by one 5 mg tablet every 12 hours. (Dispense starter pack if available) 74 tablet 0   • buPROPion XL (WELLBUTRIN XL) 300 MG 24 hr tablet      • buPROPion XL (WELLBUTRIN XL) 300 MG 24 hr tablet      • amitriptyline (ELAVIL) 25 MG tablet Take 1 tablet by mouth At Night As Needed for Sleep. 30 tablet 1     No current facility-administered medications on file prior to visit.        ALLERGIES:    Allergies   Allergen Reactions   • Penicillins Other (See Comments)     Unknown, allergic as a child        Social History     Socioeconomic History   • Marital status:    Tobacco Use   • Smoking status: Never Smoker   • Smokeless tobacco: Never Used   Substance and Sexual Activity   • Alcohol use: Yes     Comment: rarely   • Drug use: Never        No family history on file.     Review of Systems   Constitutional: Positive for activity change. Negative for chills, fatigue and fever.   HENT: Negative for mouth sores, trouble swallowing and voice change.    Eyes: Negative for pain and visual disturbance.   Respiratory: Negative for cough, shortness of breath and wheezing.    Cardiovascular: Negative for chest pain and palpitations.   Gastrointestinal: Negative for abdominal pain, constipation, diarrhea, nausea and vomiting.   Genitourinary: Negative for difficulty urinating, frequency and urgency.   Musculoskeletal: Negative for arthralgias and joint swelling.        Left calf pain and swelling   Skin: Negative for rash.   Neurological: Negative for dizziness, seizures, weakness and headaches.   Hematological: Negative for adenopathy. Does not bruise/bleed easily.   Psychiatric/Behavioral: Negative for behavioral problems and confusion. The patient is not nervous/anxious.         Objective     Vitals:    04/05/22 0811   BP: 128/72   Pulse: 61   Resp: 16   Temp: 97.5 °F (36.4 °C)   TempSrc: Temporal   SpO2: 96%   Weight: 120 kg (265 lb 4.8 oz)   Height: 170.2 cm (67\")   PainSc:   6   PainLoc: " Leg  Comment: Lower Leg Pain     Current Status 4/5/2022   ECOG score 0       Physical Exam  Constitutional:       General: He is not in acute distress.     Appearance: He is well-developed.   HENT:      Head: Normocephalic.   Eyes:      General: No scleral icterus.     Conjunctiva/sclera: Conjunctivae normal.      Pupils: Pupils are equal, round, and reactive to light.   Neck:      Thyroid: No thyromegaly.      Vascular: No JVD.   Cardiovascular:      Rate and Rhythm: Normal rate and regular rhythm.      Heart sounds: No murmur heard.    No friction rub. No gallop.   Pulmonary:      Effort: Pulmonary effort is normal.      Breath sounds: Normal breath sounds. No wheezing or rales.   Abdominal:      General: There is no distension.      Palpations: Abdomen is soft. There is no mass.      Tenderness: There is no abdominal tenderness.   Musculoskeletal:         General: Tenderness present. No deformity. Normal range of motion.      Cervical back: Normal range of motion and neck supple.      Comments: Tenderness in the left calf muscle.  No palpable cord.  Homans' sign positive   Lymphadenopathy:      Cervical: No cervical adenopathy.   Skin:     General: Skin is warm and dry.      Findings: No erythema or rash.   Neurological:      Mental Status: He is alert and oriented to person, place, and time.      Cranial Nerves: No cranial nerve deficit.      Deep Tendon Reflexes: Reflexes are normal and symmetric.   Psychiatric:         Behavior: Behavior normal.         Judgment: Judgment normal.           RECENT LABS:  Hematology WBC   Date Value Ref Range Status   04/30/2021 6.24 3.40 - 10.80 10*3/mm3 Final     RBC   Date Value Ref Range Status   04/30/2021 4.84 4.14 - 5.80 10*6/mm3 Final     Hemoglobin   Date Value Ref Range Status   04/30/2021 14.5 13.0 - 17.7 g/dL Final   07/09/2019 14.6 13.0 - 17.7 g/dL Final     Hematocrit   Date Value Ref Range Status   04/30/2021 42.1 37.5 - 51.0 % Final   07/09/2019 42.8 37.5 - 51.0  % Final     Platelets   Date Value Ref Range Status   04/30/2021 241 140 - 450 10*3/mm3 Final   07/09/2019 346 140 - 450 10*3/mm3 Final          VENOUS DOPPLER 4/1/2022  Interpretation Summary    · Acute left lower extremity deep vein thrombosis noted in the posterial tibial, gastrocnemius and soleal.  · All other left sided veins appeared normal.        Assessment/Plan   1.  Acute left calf DVT and is otherwise healthy 36-year-old gentleman with no obvious predisposing risk factors other than moderate obesity.  2.  Family history of DVTs in his mother.    Recommendations  1.  We will proceed with a laboratory thrombophilia evaluation to assess for acquired and inherited hypercoagulable defects to include Antithrombin III level, protein C and protein S activity, antiphospholipid antibodies, prothrombin gene mutation, and factor V Leiden mutation.  2.  Provided the patient a prescription for below the knee compression stockings and instructed him to wear the stockings whenever he is going to be up on his feet or sitting with his feet down for any length of time.  3.  We also instructed him to try to keep the leg elevated as much as possible over the next several days to try to minimize swelling and discomfort.  4.  We agree with Eliquis anticoagulation.  He is still on the initial 10 mg twice daily dose for a few more days and then will be taking 5 mg twice daily thereafter.  5.  We will plan to see the patient back in our office in 3 weeks to review the hypercoagulable work-up and make further recommendations from there.  6.  We told the patient that he will need to stay on Eliquis for at least 3 months and possibly 6 months.  Further findings from the hypercoagulable work-up may modify this recommendation someone.    Thanks for asking us to see this nice gentleman in consultation.

## 2022-04-06 LAB
CARDIOLIPIN IGG SER IA-ACNC: <9 GPL U/ML (ref 0–14)
CARDIOLIPIN IGM SER IA-ACNC: <9 MPL U/ML (ref 0–12)
F5 GENE MUT ANL BLD/T: NORMAL
FACTOR II, DNA ANALYSIS: NORMAL

## 2022-04-07 LAB
B2 GLYCOPROT1 IGA SER-ACNC: <9 GPI IGA UNITS (ref 0–25)
B2 GLYCOPROT1 IGG SER-ACNC: <9 GPI IGG UNITS (ref 0–20)
B2 GLYCOPROT1 IGM SER-ACNC: <9 GPI IGM UNITS (ref 0–32)
DRVVT SCREEN TO CONFIRM RATIO: 1.2 RATIO (ref 0.8–1.2)
LA 2 SCREEN W REFLEX-IMP: ABNORMAL
MIXING DRVVT: 45 SEC (ref 0–40.4)
SCREEN APTT: 37 SEC (ref 0–51.9)
SCREEN DRVVT: 62.6 SEC (ref 0–47)

## 2022-04-08 LAB
AT III PPP CHRO-ACNC: 122 % (ref 90–134)
PROT C ACT/NOR PPP: 200 % (ref 86–163)
PROT S ACT/NOR PPP: 129 % (ref 70–127)
PROT S FREE PPP-ACNC: 118 % (ref 49–138)

## 2022-04-29 ENCOUNTER — LAB (OUTPATIENT)
Dept: OTHER | Facility: HOSPITAL | Age: 37
End: 2022-04-29

## 2022-04-29 ENCOUNTER — OFFICE VISIT (OUTPATIENT)
Dept: ONCOLOGY | Facility: CLINIC | Age: 37
End: 2022-04-29

## 2022-04-29 VITALS
HEART RATE: 54 BPM | OXYGEN SATURATION: 96 % | TEMPERATURE: 97.1 F | BODY MASS INDEX: 41.88 KG/M2 | HEIGHT: 67 IN | RESPIRATION RATE: 16 BRPM | SYSTOLIC BLOOD PRESSURE: 127 MMHG | WEIGHT: 266.8 LBS | DIASTOLIC BLOOD PRESSURE: 67 MMHG

## 2022-04-29 DIAGNOSIS — I82.462 ACUTE DEEP VEIN THROMBOSIS (DVT) OF CALF MUSCLE VEIN OF LEFT LOWER EXTREMITY: ICD-10-CM

## 2022-04-29 DIAGNOSIS — I82.462 ACUTE DEEP VEIN THROMBOSIS (DVT) OF CALF MUSCLE VEIN OF LEFT LOWER EXTREMITY: Primary | ICD-10-CM

## 2022-04-29 LAB
BASOPHILS # BLD AUTO: 0.05 10*3/MM3 (ref 0–0.2)
BASOPHILS NFR BLD AUTO: 0.8 % (ref 0–1.5)
DEPRECATED RDW RBC AUTO: 36 FL (ref 37–54)
EOSINOPHIL # BLD AUTO: 0.15 10*3/MM3 (ref 0–0.4)
EOSINOPHIL NFR BLD AUTO: 2.3 % (ref 0.3–6.2)
ERYTHROCYTE [DISTWIDTH] IN BLOOD BY AUTOMATED COUNT: 12 % (ref 12.3–15.4)
HCT VFR BLD AUTO: 42.3 % (ref 37.5–51)
HGB BLD-MCNC: 14.8 G/DL (ref 13–17.7)
IMM GRANULOCYTES # BLD AUTO: 0.04 10*3/MM3 (ref 0–0.05)
IMM GRANULOCYTES NFR BLD AUTO: 0.6 % (ref 0–0.5)
LYMPHOCYTES # BLD AUTO: 2.21 10*3/MM3 (ref 0.7–3.1)
LYMPHOCYTES NFR BLD AUTO: 33.3 % (ref 19.6–45.3)
MCH RBC QN AUTO: 29 PG (ref 26.6–33)
MCHC RBC AUTO-ENTMCNC: 35 G/DL (ref 31.5–35.7)
MCV RBC AUTO: 82.9 FL (ref 79–97)
MONOCYTES # BLD AUTO: 0.53 10*3/MM3 (ref 0.1–0.9)
MONOCYTES NFR BLD AUTO: 8 % (ref 5–12)
NEUTROPHILS NFR BLD AUTO: 3.65 10*3/MM3 (ref 1.7–7)
NEUTROPHILS NFR BLD AUTO: 55 % (ref 42.7–76)
NRBC BLD AUTO-RTO: 0 /100 WBC (ref 0–0.2)
PLATELET # BLD AUTO: 255 10*3/MM3 (ref 140–450)
PMV BLD AUTO: 9.7 FL (ref 6–12)
RBC # BLD AUTO: 5.1 10*6/MM3 (ref 4.14–5.8)
WBC NRBC COR # BLD: 6.63 10*3/MM3 (ref 3.4–10.8)

## 2022-04-29 PROCEDURE — 85025 COMPLETE CBC W/AUTO DIFF WBC: CPT | Performed by: INTERNAL MEDICINE

## 2022-04-29 PROCEDURE — 99214 OFFICE O/P EST MOD 30 MIN: CPT | Performed by: INTERNAL MEDICINE

## 2022-04-29 PROCEDURE — 36415 COLL VENOUS BLD VENIPUNCTURE: CPT

## 2022-04-29 NOTE — PROGRESS NOTES
Subjective     REASON FOR FOLLOW UP: Left lower extremity calf vein DVT    HISTORY OF PRESENT ILLNESS:  The patient is a 36 y.o. year old male who is a generally healthy young man with no chronic medical problems.  He presented to the emergency room on 4/1/2022 with pain in the left lower extremity.  He underwent a venous Doppler study acute DVT in the posterior tibial, gastrocnemius, and soleal veins.  He was started on Eliquis 10 mg p.o. twice daily x7 days to be followed by 5 mg twice daily thereafter.    He was seen for initial consult in our office 4/5/2022 to undergo a hypercoagulable work-up to try to explain the development of this clot.  He does have a family history of blood clots in his mother who had DVTs.  He has not had any significant injury or surgery on the leg.  He had recently traveled to [place] in a car but had gotten out every couple of hours.  He is not on any hormonal therapies.  He has no history of malignancy.    On today's visit, the patient reports the leg is feeling better.  He is wearing an over-the-counter compression stocking and is scheduled to have a measurement for fitted compression stocking.  He is not having any bleeding issues on Eliquis.    We reviewed the results of the hypercoagulable work-up and explained that we do not see any signs of an acquired or inherited thrombophilic defect based on these labs.  We will plan to continue Eliquis for another 3 months and see him back in the office with repeat Doppler study at that time.    History of Present Illness     Past Medical History:   Diagnosis Date   • History of DVT (deep vein thrombosis) 04/2022    Left lower extremity, calf muscle vein   • Infectious mononucleosis    • No pertinent past medical history         History reviewed. No pertinent surgical history.     Current Outpatient Medications on File Prior to Visit   Medication Sig Dispense Refill   • Apixaban Starter Pack tablet therapy pack Take two 5 mg tablets by  mouth every 12 hours for 7 days. Followed by one 5 mg tablet every 12 hours. (Dispense starter pack if available) 74 tablet 0   • buPROPion XL (WELLBUTRIN XL) 300 MG 24 hr tablet      • amitriptyline (ELAVIL) 25 MG tablet Take 1 tablet by mouth At Night As Needed for Sleep. 30 tablet 1   • buPROPion XL (WELLBUTRIN XL) 300 MG 24 hr tablet        No current facility-administered medications on file prior to visit.        ALLERGIES:    Allergies   Allergen Reactions   • Penicillins Other (See Comments)     Unknown, allergic as a child        Social History     Socioeconomic History   • Marital status:    Tobacco Use   • Smoking status: Never Smoker   • Smokeless tobacco: Never Used   Substance and Sexual Activity   • Alcohol use: Yes     Comment: rarely   • Drug use: Never        History reviewed. No pertinent family history.     Review of Systems   Constitutional: Positive for activity change. Negative for chills, fatigue and fever.   HENT: Negative for mouth sores, trouble swallowing and voice change.    Eyes: Negative for pain and visual disturbance.   Respiratory: Negative for cough, shortness of breath and wheezing.    Cardiovascular: Negative for chest pain and palpitations.   Gastrointestinal: Negative for abdominal pain, constipation, diarrhea, nausea and vomiting.   Genitourinary: Negative for difficulty urinating, frequency and urgency.   Musculoskeletal: Negative for arthralgias and joint swelling.        Left calf pain and swelling   Skin: Negative for rash.   Neurological: Negative for dizziness, seizures, weakness and headaches.   Hematological: Negative for adenopathy. Does not bruise/bleed easily.   Psychiatric/Behavioral: Negative for behavioral problems and confusion. The patient is not nervous/anxious.         Objective     Vitals:    04/29/22 0932   BP: 127/67   Pulse: 54   Resp: 16   Temp: 97.1 °F (36.2 °C)   TempSrc: Temporal   SpO2: 96%   Weight: 121 kg (266 lb 12.8 oz)   Height: 170.2 cm  "(67.01\")   PainSc: 0-No pain     Current Status 4/5/2022   ECOG score 0       Physical Exam  Constitutional:       General: He is not in acute distress.     Appearance: He is well-developed.   HENT:      Head: Normocephalic.   Eyes:      General: No scleral icterus.     Conjunctiva/sclera: Conjunctivae normal.      Pupils: Pupils are equal, round, and reactive to light.   Neck:      Thyroid: No thyromegaly.      Vascular: No JVD.   Cardiovascular:      Rate and Rhythm: Normal rate and regular rhythm.      Heart sounds: No murmur heard.    No friction rub. No gallop.   Pulmonary:      Effort: Pulmonary effort is normal.      Breath sounds: Normal breath sounds. No wheezing or rales.   Abdominal:      General: There is no distension.      Palpations: Abdomen is soft. There is no mass.      Tenderness: There is no abdominal tenderness.   Musculoskeletal:         General: Tenderness present. No deformity. Normal range of motion.      Cervical back: Normal range of motion and neck supple.      Comments: Tenderness in the left calf muscle.  No palpable cord.  Homans' sign positive   Lymphadenopathy:      Cervical: No cervical adenopathy.   Skin:     General: Skin is warm and dry.      Findings: No erythema or rash.   Neurological:      Mental Status: He is alert and oriented to person, place, and time.      Cranial Nerves: No cranial nerve deficit.      Deep Tendon Reflexes: Reflexes are normal and symmetric.   Psychiatric:         Behavior: Behavior normal.         Judgment: Judgment normal.           RECENT LABS:  Hematology WBC   Date Value Ref Range Status   04/29/2022 6.63 3.40 - 10.80 10*3/mm3 Final   04/30/2021 6.24 3.40 - 10.80 10*3/mm3 Final     RBC   Date Value Ref Range Status   04/29/2022 5.10 4.14 - 5.80 10*6/mm3 Final   04/30/2021 4.84 4.14 - 5.80 10*6/mm3 Final     Hemoglobin   Date Value Ref Range Status   04/29/2022 14.8 13.0 - 17.7 g/dL Final     Hematocrit   Date Value Ref Range Status   04/29/2022 42.3 " 37.5 - 51.0 % Final     Platelets   Date Value Ref Range Status   04/29/2022 255 140 - 450 10*3/mm3 Final          VENOUS DOPPLER 4/1/2022  Interpretation Summary    · Acute left lower extremity deep vein thrombosis noted in the posterial tibial, gastrocnemius and soleal.  · All other left sided veins appeared normal.        Assessment/Plan   1.  Acute left calf DVT and is otherwise healthy 36-year-old gentleman with no obvious predisposing risk factors other than moderate obesity.  2.  Family history of DVTs in his mother.  3.  Complete laboratory hypercoagulable work-up was unremarkable with no evidence of inherited or acquired thrombophilic defects.    Recommendations  1.  We reviewed the results of the hypercoagulable work-up with the patient and his wife and explained that we do not see any significant thrombophilic defects based on his extensive laboratory evaluation.  I specifically mentioned that his factor V Leiden and prothrombin gene mutation were negative therefore we do not see signs of these common hereditary thrombophilia defects in his case.  2.  We recommended he remain on Eliquis for now.  3.  We recommended continuing to wear compression stockings.  4.  MD follow-up in 3 months with repeat Doppler studies of the lower extremities bilaterally performed 1 week prior to that visit.  5.  If the Doppler study shows resolution of the clot on the neck study we will be discontinuing Eliquis after the next visit.

## 2022-06-27 ENCOUNTER — HOSPITAL ENCOUNTER (EMERGENCY)
Facility: HOSPITAL | Age: 37
Discharge: HOME | End: 2022-06-28
Attending: EMERGENCY MEDICINE | Admitting: EMERGENCY MEDICINE

## 2022-06-27 ENCOUNTER — APPOINTMENT (OUTPATIENT)
Dept: GENERAL RADIOLOGY | Facility: HOSPITAL | Age: 37
End: 2022-06-27

## 2022-06-27 DIAGNOSIS — U07.1 COVID-19: Primary | ICD-10-CM

## 2022-06-27 DIAGNOSIS — E86.0 DEHYDRATION: ICD-10-CM

## 2022-06-27 DIAGNOSIS — J02.9 PHARYNGITIS, UNSPECIFIED ETIOLOGY: ICD-10-CM

## 2022-06-27 LAB
ALBUMIN SERPL-MCNC: 4.7 G/DL (ref 3.5–5.2)
ALBUMIN/GLOB SERPL: 1.5 G/DL
ALP SERPL-CCNC: 73 U/L (ref 39–117)
ALT SERPL W P-5'-P-CCNC: 35 U/L (ref 1–41)
ANION GAP SERPL CALCULATED.3IONS-SCNC: 12.4 MMOL/L (ref 5–15)
AST SERPL-CCNC: 18 U/L (ref 1–40)
BASOPHILS # BLD AUTO: 0.01 10*3/MM3 (ref 0–0.2)
BASOPHILS NFR BLD AUTO: 0.1 % (ref 0–1.5)
BILIRUB SERPL-MCNC: 0.6 MG/DL (ref 0–1.2)
BUN SERPL-MCNC: 14 MG/DL (ref 6–20)
BUN/CREAT SERPL: 9.6 (ref 7–25)
CALCIUM SPEC-SCNC: 10.2 MG/DL (ref 8.6–10.5)
CHLORIDE SERPL-SCNC: 100 MMOL/L (ref 98–107)
CO2 SERPL-SCNC: 22.6 MMOL/L (ref 22–29)
CREAT SERPL-MCNC: 1.46 MG/DL (ref 0.76–1.27)
DEPRECATED RDW RBC AUTO: 40.3 FL (ref 37–54)
EGFRCR SERPLBLD CKD-EPI 2021: 63.5 ML/MIN/1.73
EOSINOPHIL # BLD AUTO: 0 10*3/MM3 (ref 0–0.4)
EOSINOPHIL NFR BLD AUTO: 0 % (ref 0.3–6.2)
ERYTHROCYTE [DISTWIDTH] IN BLOOD BY AUTOMATED COUNT: 13.1 % (ref 12.3–15.4)
GLOBULIN UR ELPH-MCNC: 3.2 GM/DL
GLUCOSE SERPL-MCNC: 101 MG/DL (ref 65–99)
HCT VFR BLD AUTO: 47.8 % (ref 37.5–51)
HETEROPH AB SER QL LA: NEGATIVE
HGB BLD-MCNC: 16.3 G/DL (ref 13–17.7)
IMM GRANULOCYTES # BLD AUTO: 0.03 10*3/MM3 (ref 0–0.05)
IMM GRANULOCYTES NFR BLD AUTO: 0.3 % (ref 0–0.5)
LYMPHOCYTES # BLD AUTO: 1.21 10*3/MM3 (ref 0.7–3.1)
LYMPHOCYTES NFR BLD AUTO: 12.3 % (ref 19.6–45.3)
MCH RBC QN AUTO: 29.2 PG (ref 26.6–33)
MCHC RBC AUTO-ENTMCNC: 34.1 G/DL (ref 31.5–35.7)
MCV RBC AUTO: 85.7 FL (ref 79–97)
MONOCYTES # BLD AUTO: 0.89 10*3/MM3 (ref 0.1–0.9)
MONOCYTES NFR BLD AUTO: 9.1 % (ref 5–12)
NEUTROPHILS NFR BLD AUTO: 7.67 10*3/MM3 (ref 1.7–7)
NEUTROPHILS NFR BLD AUTO: 78.2 % (ref 42.7–76)
NRBC BLD AUTO-RTO: 0 /100 WBC (ref 0–0.2)
PLATELET # BLD AUTO: 245 10*3/MM3 (ref 140–450)
PMV BLD AUTO: 9.5 FL (ref 6–12)
POTASSIUM SERPL-SCNC: 4.1 MMOL/L (ref 3.5–5.2)
PROT SERPL-MCNC: 7.9 G/DL (ref 6–8.5)
RBC # BLD AUTO: 5.58 10*6/MM3 (ref 4.14–5.8)
S PYO AG THROAT QL: NEGATIVE
SODIUM SERPL-SCNC: 135 MMOL/L (ref 136–145)
TROPONIN T SERPL-MCNC: <0.01 NG/ML (ref 0–0.03)
WBC NRBC COR # BLD: 9.81 10*3/MM3 (ref 3.4–10.8)

## 2022-06-27 PROCEDURE — 85025 COMPLETE CBC W/AUTO DIFF WBC: CPT | Performed by: EMERGENCY MEDICINE

## 2022-06-27 PROCEDURE — 87880 STREP A ASSAY W/OPTIC: CPT | Performed by: EMERGENCY MEDICINE

## 2022-06-27 PROCEDURE — 80053 COMPREHEN METABOLIC PANEL: CPT | Performed by: EMERGENCY MEDICINE

## 2022-06-27 PROCEDURE — 93005 ELECTROCARDIOGRAM TRACING: CPT | Performed by: EMERGENCY MEDICINE

## 2022-06-27 PROCEDURE — 87081 CULTURE SCREEN ONLY: CPT | Performed by: EMERGENCY MEDICINE

## 2022-06-27 PROCEDURE — 86308 HETEROPHILE ANTIBODY SCREEN: CPT | Performed by: EMERGENCY MEDICINE

## 2022-06-27 PROCEDURE — 84484 ASSAY OF TROPONIN QUANT: CPT | Performed by: EMERGENCY MEDICINE

## 2022-06-27 PROCEDURE — 99283 EMERGENCY DEPT VISIT LOW MDM: CPT

## 2022-06-27 PROCEDURE — 93010 ELECTROCARDIOGRAM REPORT: CPT | Performed by: INTERNAL MEDICINE

## 2022-06-27 PROCEDURE — 71045 X-RAY EXAM CHEST 1 VIEW: CPT

## 2022-06-27 RX ORDER — ACETAMINOPHEN 500 MG
1000 TABLET ORAL ONCE
Status: COMPLETED | OUTPATIENT
Start: 2022-06-27 | End: 2022-06-27

## 2022-06-27 RX ORDER — BENZONATATE 200 MG/1
200 CAPSULE ORAL 3 TIMES DAILY PRN
Qty: 21 CAPSULE | Refills: 0 | Status: SHIPPED | OUTPATIENT
Start: 2022-06-27 | End: 2022-11-17

## 2022-06-27 RX ORDER — BENZONATATE 100 MG/1
200 CAPSULE ORAL ONCE
Status: DISCONTINUED | OUTPATIENT
Start: 2022-06-27 | End: 2022-06-28 | Stop reason: HOSPADM

## 2022-06-27 RX ADMIN — PHENOL 1 SPRAY: 1.5 LIQUID ORAL at 22:50

## 2022-06-27 RX ADMIN — ACETAMINOPHEN 1000 MG: 500 TABLET ORAL at 22:05

## 2022-06-27 RX ADMIN — SODIUM CHLORIDE 1000 ML: 9 INJECTION, SOLUTION INTRAVENOUS at 23:34

## 2022-06-28 VITALS
RESPIRATION RATE: 16 BRPM | OXYGEN SATURATION: 95 % | HEART RATE: 64 BPM | DIASTOLIC BLOOD PRESSURE: 80 MMHG | SYSTOLIC BLOOD PRESSURE: 125 MMHG | TEMPERATURE: 98.2 F

## 2022-06-28 LAB — QT INTERVAL: 357 MS

## 2022-06-29 LAB — BACTERIA SPEC AEROBE CULT: NORMAL

## 2022-07-12 ENCOUNTER — APPOINTMENT (OUTPATIENT)
Dept: CARDIOLOGY | Facility: HOSPITAL | Age: 37
End: 2022-07-12

## 2022-07-21 ENCOUNTER — TELEPHONE (OUTPATIENT)
Dept: ONCOLOGY | Facility: CLINIC | Age: 37
End: 2022-07-21

## 2022-07-21 NOTE — TELEPHONE ENCOUNTER
"----- Message from Zandra Mayorga RN sent at 7/20/2022  3:29 PM EDT -----  Regarding: need appt reschedule  Hi Ms Garcia,    Can this patient possibly be rescheduled? He missed his doppler study 7/12 since he was sick. Per last MD visit note: \"If Doppler study shows resolution of the clot on the neck study we will be discontinuing Eliquis after the next visit.\" His appt is on Friday 7/22 but it seems that he needs the study prior to MD visit. Amanda agrees as well. Thank you!    "

## 2022-07-22 ENCOUNTER — APPOINTMENT (OUTPATIENT)
Dept: OTHER | Facility: HOSPITAL | Age: 37
End: 2022-07-22

## 2022-07-26 ENCOUNTER — HOSPITAL ENCOUNTER (OUTPATIENT)
Dept: CARDIOLOGY | Facility: HOSPITAL | Age: 37
Discharge: HOME | End: 2022-07-26
Admitting: INTERNAL MEDICINE

## 2022-07-26 DIAGNOSIS — I82.462 ACUTE DEEP VEIN THROMBOSIS (DVT) OF CALF MUSCLE VEIN OF LEFT LOWER EXTREMITY: ICD-10-CM

## 2022-07-26 LAB
BH CV LOW VAS LEFT GASTRONEMIUS VESSEL: 1
BH CV LOW VAS LEFT POSTERIOR TIBIAL VESSEL: 1
BH CV LOW VAS LEFT SOLEAL VESSEL: 1
BH CV LOWER VASCULAR LEFT COMMON FEMORAL AUGMENT: NORMAL
BH CV LOWER VASCULAR LEFT COMMON FEMORAL COMPETENT: NORMAL
BH CV LOWER VASCULAR LEFT COMMON FEMORAL COMPRESS: NORMAL
BH CV LOWER VASCULAR LEFT COMMON FEMORAL PHASIC: NORMAL
BH CV LOWER VASCULAR LEFT COMMON FEMORAL SPONT: NORMAL
BH CV LOWER VASCULAR LEFT DISTAL FEMORAL COMPRESS: NORMAL
BH CV LOWER VASCULAR LEFT GASTRONEMIUS COMPRESS: NORMAL
BH CV LOWER VASCULAR LEFT GASTRONEMIUS THROMBUS: NORMAL
BH CV LOWER VASCULAR LEFT GREATER SAPH AK COMPRESS: NORMAL
BH CV LOWER VASCULAR LEFT GREATER SAPH BK COMPRESS: NORMAL
BH CV LOWER VASCULAR LEFT LESSER SAPH COMPRESS: NORMAL
BH CV LOWER VASCULAR LEFT MID FEMORAL AUGMENT: NORMAL
BH CV LOWER VASCULAR LEFT MID FEMORAL COMPETENT: NORMAL
BH CV LOWER VASCULAR LEFT MID FEMORAL COMPRESS: NORMAL
BH CV LOWER VASCULAR LEFT MID FEMORAL PHASIC: NORMAL
BH CV LOWER VASCULAR LEFT MID FEMORAL SPONT: NORMAL
BH CV LOWER VASCULAR LEFT PERONEAL COMPRESS: NORMAL
BH CV LOWER VASCULAR LEFT POPLITEAL AUGMENT: NORMAL
BH CV LOWER VASCULAR LEFT POPLITEAL COMPETENT: NORMAL
BH CV LOWER VASCULAR LEFT POPLITEAL COMPRESS: NORMAL
BH CV LOWER VASCULAR LEFT POPLITEAL PHASIC: NORMAL
BH CV LOWER VASCULAR LEFT POPLITEAL SPONT: NORMAL
BH CV LOWER VASCULAR LEFT POSTERIOR TIBIAL COMPRESS: NORMAL
BH CV LOWER VASCULAR LEFT POSTERIOR TIBIAL THROMBUS: NORMAL
BH CV LOWER VASCULAR LEFT PROFUNDA FEMORAL COMPRESS: NORMAL
BH CV LOWER VASCULAR LEFT PROXIMAL FEMORAL COMPRESS: NORMAL
BH CV LOWER VASCULAR LEFT SAPHENOFEMORAL JUNCTION COMPRESS: NORMAL
BH CV LOWER VASCULAR LEFT SOLEAL COMPRESS: NORMAL
BH CV LOWER VASCULAR LEFT SOLEAL THROMBUS: NORMAL
BH CV LOWER VASCULAR RIGHT COMMON FEMORAL AUGMENT: NORMAL
BH CV LOWER VASCULAR RIGHT COMMON FEMORAL COMPETENT: NORMAL
BH CV LOWER VASCULAR RIGHT COMMON FEMORAL COMPRESS: NORMAL
BH CV LOWER VASCULAR RIGHT COMMON FEMORAL PHASIC: NORMAL
BH CV LOWER VASCULAR RIGHT COMMON FEMORAL SPONT: NORMAL
BH CV LOWER VASCULAR RIGHT DISTAL FEMORAL COMPRESS: NORMAL
BH CV LOWER VASCULAR RIGHT GASTRONEMIUS COMPRESS: NORMAL
BH CV LOWER VASCULAR RIGHT GREATER SAPH AK COMPRESS: NORMAL
BH CV LOWER VASCULAR RIGHT GREATER SAPH BK COMPRESS: NORMAL
BH CV LOWER VASCULAR RIGHT LESSER SAPH COMPRESS: NORMAL
BH CV LOWER VASCULAR RIGHT MID FEMORAL AUGMENT: NORMAL
BH CV LOWER VASCULAR RIGHT MID FEMORAL COMPETENT: NORMAL
BH CV LOWER VASCULAR RIGHT MID FEMORAL COMPRESS: NORMAL
BH CV LOWER VASCULAR RIGHT MID FEMORAL PHASIC: NORMAL
BH CV LOWER VASCULAR RIGHT MID FEMORAL SPONT: NORMAL
BH CV LOWER VASCULAR RIGHT PERONEAL COMPRESS: NORMAL
BH CV LOWER VASCULAR RIGHT POPLITEAL AUGMENT: NORMAL
BH CV LOWER VASCULAR RIGHT POPLITEAL COMPETENT: NORMAL
BH CV LOWER VASCULAR RIGHT POPLITEAL COMPRESS: NORMAL
BH CV LOWER VASCULAR RIGHT POPLITEAL PHASIC: NORMAL
BH CV LOWER VASCULAR RIGHT POPLITEAL SPONT: NORMAL
BH CV LOWER VASCULAR RIGHT POSTERIOR TIBIAL COMPRESS: NORMAL
BH CV LOWER VASCULAR RIGHT PROFUNDA FEMORAL COMPRESS: NORMAL
BH CV LOWER VASCULAR RIGHT PROXIMAL FEMORAL COMPRESS: NORMAL
BH CV LOWER VASCULAR RIGHT SAPHENOFEMORAL JUNCTION COMPRESS: NORMAL
MAXIMAL PREDICTED HEART RATE: 184 BPM
STRESS TARGET HR: 156 BPM

## 2022-07-26 PROCEDURE — 93970 EXTREMITY STUDY: CPT

## 2022-08-09 ENCOUNTER — OFFICE VISIT (OUTPATIENT)
Dept: ONCOLOGY | Facility: CLINIC | Age: 37
End: 2022-08-09

## 2022-08-09 ENCOUNTER — LAB (OUTPATIENT)
Dept: OTHER | Facility: HOSPITAL | Age: 37
End: 2022-08-09

## 2022-08-09 VITALS
TEMPERATURE: 97.5 F | BODY MASS INDEX: 41.17 KG/M2 | SYSTOLIC BLOOD PRESSURE: 133 MMHG | HEART RATE: 52 BPM | RESPIRATION RATE: 18 BRPM | HEIGHT: 67 IN | WEIGHT: 262.3 LBS | DIASTOLIC BLOOD PRESSURE: 82 MMHG | OXYGEN SATURATION: 97 %

## 2022-08-09 DIAGNOSIS — I82.462 ACUTE DEEP VEIN THROMBOSIS (DVT) OF CALF MUSCLE VEIN OF LEFT LOWER EXTREMITY: Primary | ICD-10-CM

## 2022-08-09 DIAGNOSIS — I82.552 CHRONIC DEEP VEIN THROMBOSIS (DVT) OF LEFT PERONEAL VEIN: ICD-10-CM

## 2022-08-09 DIAGNOSIS — I82.462 ACUTE DEEP VEIN THROMBOSIS (DVT) OF CALF MUSCLE VEIN OF LEFT LOWER EXTREMITY: ICD-10-CM

## 2022-08-09 LAB
BASOPHILS # BLD AUTO: 0.03 10*3/MM3 (ref 0–0.2)
BASOPHILS NFR BLD AUTO: 0.4 % (ref 0–1.5)
DEPRECATED RDW RBC AUTO: 39.1 FL (ref 37–54)
EOSINOPHIL # BLD AUTO: 0.12 10*3/MM3 (ref 0–0.4)
EOSINOPHIL NFR BLD AUTO: 1.6 % (ref 0.3–6.2)
ERYTHROCYTE [DISTWIDTH] IN BLOOD BY AUTOMATED COUNT: 12.6 % (ref 12.3–15.4)
HCT VFR BLD AUTO: 43.7 % (ref 37.5–51)
HGB BLD-MCNC: 15 G/DL (ref 13–17.7)
IMM GRANULOCYTES # BLD AUTO: 0.02 10*3/MM3 (ref 0–0.05)
IMM GRANULOCYTES NFR BLD AUTO: 0.3 % (ref 0–0.5)
LYMPHOCYTES # BLD AUTO: 2.34 10*3/MM3 (ref 0.7–3.1)
LYMPHOCYTES NFR BLD AUTO: 31.3 % (ref 19.6–45.3)
MCH RBC QN AUTO: 29.8 PG (ref 26.6–33)
MCHC RBC AUTO-ENTMCNC: 34.3 G/DL (ref 31.5–35.7)
MCV RBC AUTO: 86.9 FL (ref 79–97)
MONOCYTES # BLD AUTO: 0.46 10*3/MM3 (ref 0.1–0.9)
MONOCYTES NFR BLD AUTO: 6.1 % (ref 5–12)
NEUTROPHILS NFR BLD AUTO: 4.51 10*3/MM3 (ref 1.7–7)
NEUTROPHILS NFR BLD AUTO: 60.3 % (ref 42.7–76)
NRBC BLD AUTO-RTO: 0 /100 WBC (ref 0–0.2)
PLATELET # BLD AUTO: 284 10*3/MM3 (ref 140–450)
PMV BLD AUTO: 9.4 FL (ref 6–12)
RBC # BLD AUTO: 5.03 10*6/MM3 (ref 4.14–5.8)
WBC NRBC COR # BLD: 7.48 10*3/MM3 (ref 3.4–10.8)

## 2022-08-09 PROCEDURE — 85025 COMPLETE CBC W/AUTO DIFF WBC: CPT | Performed by: INTERNAL MEDICINE

## 2022-08-09 PROCEDURE — 36415 COLL VENOUS BLD VENIPUNCTURE: CPT

## 2022-08-09 PROCEDURE — 99213 OFFICE O/P EST LOW 20 MIN: CPT | Performed by: INTERNAL MEDICINE

## 2022-08-09 RX ORDER — ZOLPIDEM TARTRATE 10 MG/1
TABLET ORAL
COMMUNITY
Start: 2022-07-26

## 2022-08-09 RX ORDER — DEXTROAMPHETAMINE SACCHARATE, AMPHETAMINE ASPARTATE MONOHYDRATE, DEXTROAMPHETAMINE SULFATE AND AMPHETAMINE SULFATE 2.5; 2.5; 2.5; 2.5 MG/1; MG/1; MG/1; MG/1
CAPSULE, EXTENDED RELEASE ORAL
COMMUNITY
Start: 2022-07-26 | End: 2022-11-17

## 2022-08-09 NOTE — PROGRESS NOTES
Subjective     REASON FOR FOLLOW UP: Left lower extremity calf vein DVT    HISTORY OF PRESENT ILLNESS:  The patient is a 36 y.o. year old male who is a generally healthy young man with no chronic medical problems.  He presented to the emergency room on 4/1/2022 with pain in the left lower extremity.  He underwent a venous Doppler study acute DVT in the posterior tibial, gastrocnemius, and soleal veins.  He was started on Eliquis 10 mg p.o. twice daily x7 days to be followed by 5 mg twice daily thereafter.    He was seen for initial consult in our office 4/5/2022 to undergo a hypercoagulable work-up to try to explain the development of this clot.  He does have a family history of blood clots in his mother who had DVTs.  He has not had any significant injury or surgery on the leg.  He had recently traveled to [place] in a car but had gotten out every couple of hours.  He is not on any hormonal therapies.  He has no history of malignancy.    We reviewed the results of the hypercoagulable work-up and explained that we do not see any signs of an acquired or inherited thrombophilic defect based on these labs.  We will elected to continue Eliquis for another 3 months and see him back in the office with repeat Doppler study.     INTERVAL HISTORY:  He returns today for the 3-month follow-up and review of Doppler study which was performed on 7/26/2022.  He continues to do well on Eliquis.  The Doppler report as noted below shows chronic DVT in the left calf.  He reports the pain and swelling in the calf have improved significantly.  He is not having any bleeding or bruising issues on Eliquis.    History of Present Illness     Past Medical History:   Diagnosis Date   • History of DVT (deep vein thrombosis) 04/2022    Left lower extremity, calf muscle vein   • Infectious mononucleosis    • No pertinent past medical history         No past surgical history on file.     Current Outpatient Medications on File Prior to Visit    Medication Sig Dispense Refill   • amphetamine-dextroamphetamine XR (ADDERALL XR) 10 MG 24 hr capsule      • apixaban (ELIQUIS) 5 MG tablet tablet Take 1 tablet by mouth Every 12 (Twelve) Hours. 60 tablet 3   • benzocaine-menthol (CHLORASEPTIC) 6-10 MG lozenge Take 1 lozenge by mouth Every 2 (Two) Hours As Needed for Sore Throat. 18 lozenge 0   • benzonatate (TESSALON) 200 MG capsule Take 1 capsule by mouth 3 (Three) Times a Day As Needed for Cough. 21 capsule 0   • buPROPion XL (WELLBUTRIN XL) 300 MG 24 hr tablet      • zolpidem (AMBIEN) 10 MG tablet        No current facility-administered medications on file prior to visit.        ALLERGIES:    Allergies   Allergen Reactions   • Penicillins Other (See Comments)     Unknown, allergic as a child        Social History     Socioeconomic History   • Marital status:    Tobacco Use   • Smoking status: Never Smoker   • Smokeless tobacco: Never Used   Substance and Sexual Activity   • Alcohol use: Yes     Comment: rarely   • Drug use: Never        No family history on file.     Review of Systems   Constitutional: Positive for activity change. Negative for chills, fatigue and fever.   HENT: Negative for mouth sores, trouble swallowing and voice change.    Eyes: Negative for pain and visual disturbance.   Respiratory: Negative for cough, shortness of breath and wheezing.    Cardiovascular: Negative for chest pain and palpitations.   Gastrointestinal: Negative for abdominal pain, constipation, diarrhea, nausea and vomiting.   Genitourinary: Negative for difficulty urinating, frequency and urgency.   Musculoskeletal: Negative for arthralgias and joint swelling.        Left calf pain and swelling   Skin: Negative for rash.   Neurological: Negative for dizziness, seizures, weakness and headaches.   Hematological: Negative for adenopathy. Does not bruise/bleed easily.   Psychiatric/Behavioral: Negative for behavioral problems and confusion. The patient is not  "nervous/anxious.         Objective     Vitals:    08/09/22 0948   BP: 133/82   Pulse: 52   Resp: 18   Temp: 97.5 °F (36.4 °C)   TempSrc: Temporal   SpO2: 97%   Weight: 119 kg (262 lb 4.8 oz)   Height: 170.2 cm (67.01\")   PainSc: 0-No pain     Current Status 8/9/2022   ECOG score 0       Physical Exam  Constitutional:       General: He is not in acute distress.     Appearance: He is well-developed.   HENT:      Head: Normocephalic.   Eyes:      General: No scleral icterus.     Conjunctiva/sclera: Conjunctivae normal.      Pupils: Pupils are equal, round, and reactive to light.   Neck:      Thyroid: No thyromegaly.      Vascular: No JVD.   Cardiovascular:      Rate and Rhythm: Normal rate and regular rhythm.      Heart sounds: No murmur heard.    No friction rub. No gallop.   Pulmonary:      Effort: Pulmonary effort is normal.      Breath sounds: Normal breath sounds. No wheezing or rales.   Abdominal:      General: There is no distension.      Palpations: Abdomen is soft. There is no mass.      Tenderness: There is no abdominal tenderness.   Musculoskeletal:         General: Tenderness present. No deformity. Normal range of motion.      Cervical back: Normal range of motion and neck supple.      Comments: Tenderness in the left calf muscle.  No palpable cord.  Homans' sign positive   Lymphadenopathy:      Cervical: No cervical adenopathy.   Skin:     General: Skin is warm and dry.      Findings: No erythema or rash.   Neurological:      Mental Status: He is alert and oriented to person, place, and time.      Cranial Nerves: No cranial nerve deficit.      Deep Tendon Reflexes: Reflexes are normal and symmetric.   Psychiatric:         Behavior: Behavior normal.         Judgment: Judgment normal.           RECENT LABS:  Hematology WBC   Date Value Ref Range Status   08/09/2022 7.48 3.40 - 10.80 10*3/mm3 Final   04/30/2021 6.24 3.40 - 10.80 10*3/mm3 Final     RBC   Date Value Ref Range Status   08/09/2022 5.03 4.14 - " 5.80 10*6/mm3 Final   04/30/2021 4.84 4.14 - 5.80 10*6/mm3 Final     Hemoglobin   Date Value Ref Range Status   08/09/2022 15.0 13.0 - 17.7 g/dL Final     Hematocrit   Date Value Ref Range Status   08/09/2022 43.7 37.5 - 51.0 % Final     Platelets   Date Value Ref Range Status   08/09/2022 284 140 - 450 10*3/mm3 Final            DOPPLER 7/26/2022  Interpretation Summary    · Chronic left lower extremity deep vein thrombosis noted in the posterial tibial, gastrocnemius and soleal.  · All other veins appeared normal bilaterally.        VENOUS DOPPLER 4/1/2022  Interpretation Summary    · Acute left lower extremity deep vein thrombosis noted in the posterial tibial, gastrocnemius and soleal.  · All other left sided veins appeared normal.        Assessment & Plan   1.  Acute left calf DVT and is otherwise healthy 36-year-old gentleman with no obvious predisposing risk factors other than moderate obesity.  2.  Family history of DVTs in his mother.  3.  Laboratory hypercoagulable work-up was unremarkable with no evidence of inherited or acquired thrombophilic defects.    Recommendations  1.  We reviewed the results of the Doppler study with the patient.  For now we would recommend continuing anticoagulation for another 3 months.  2.  Continue Eliquis for now.  3.  We recommended continuing to wear compression stockings.  4.  MD follow-up in 3 months with repeat Doppler studies of the lower extremities bilaterally performed 1 week prior to that visit.  5.  We also will have additional labs drawn 1 week prior to the next visit including antiphosphatidyl antibodies and a D-dimer.  6.  Depending on those results we will decide on the next visit whether or not he can discontinue Eliquis at that point.

## 2022-08-16 ENCOUNTER — TELEPHONE (OUTPATIENT)
Dept: ONCOLOGY | Facility: CLINIC | Age: 37
End: 2022-08-16

## 2022-08-16 NOTE — TELEPHONE ENCOUNTER
Returned call to patient's wife with the information that he should follow up with PCP regarding these headaches.  She v/u.

## 2022-08-16 NOTE — TELEPHONE ENCOUNTER
Caller: jonh ralph    Relationship: Emergency Contact    Best call back number: [phone]      Who are you requesting to speak with (clinical staff, provider,  specific staff member): CLINICAL    What was the call regarding: BETTY HAS HAD A HEADACHE IN HIS TEMPLE FOR THE PAST FIVE DAYS.  SHOULD THEY BE CONCERENED    Do you require a callback: YES

## 2022-08-16 NOTE — TELEPHONE ENCOUNTER
Returned call to patient's wife who is reporting that patient has been experiencing a left temporal headache for 5 days.  He takes Tylenol which eases the headache, but the pain does not completely resolve.  She is worried since it is on one side and is throbbing in nature.  She reports that he has no history of hypertension and has had no headaches like this in the past.  He is on Eliquis for history of DVT.  She wants to know if it is something to worry about, should he contact PCP or what he should do.  Please advise.

## 2022-10-05 RX ORDER — APIXABAN 5 MG/1
TABLET, FILM COATED ORAL
Qty: 60 TABLET | Refills: 3 | Status: SHIPPED | OUTPATIENT
Start: 2022-10-05 | End: 2023-03-15 | Stop reason: SDUPTHER

## 2022-11-01 ENCOUNTER — APPOINTMENT (OUTPATIENT)
Dept: OTHER | Facility: HOSPITAL | Age: 37
End: 2022-11-01

## 2022-11-01 ENCOUNTER — APPOINTMENT (OUTPATIENT)
Dept: CARDIOLOGY | Facility: HOSPITAL | Age: 37
End: 2022-11-01

## 2022-11-01 ENCOUNTER — TELEPHONE (OUTPATIENT)
Dept: ONCOLOGY | Facility: CLINIC | Age: 37
End: 2022-11-01

## 2022-11-08 ENCOUNTER — APPOINTMENT (OUTPATIENT)
Dept: OTHER | Facility: HOSPITAL | Age: 37
End: 2022-11-08

## 2022-11-15 ENCOUNTER — LAB (OUTPATIENT)
Dept: OTHER | Facility: HOSPITAL | Age: 37
End: 2022-11-15

## 2022-11-15 ENCOUNTER — HOSPITAL ENCOUNTER (OUTPATIENT)
Dept: CARDIOLOGY | Facility: HOSPITAL | Age: 37
Discharge: HOME | End: 2022-11-15
Admitting: INTERNAL MEDICINE

## 2022-11-15 DIAGNOSIS — I82.552 CHRONIC DEEP VEIN THROMBOSIS (DVT) OF LEFT PERONEAL VEIN: ICD-10-CM

## 2022-11-15 DIAGNOSIS — I82.462 ACUTE DEEP VEIN THROMBOSIS (DVT) OF CALF MUSCLE VEIN OF LEFT LOWER EXTREMITY: ICD-10-CM

## 2022-11-15 LAB
ALBUMIN SERPL-MCNC: 4.4 G/DL (ref 3.5–5.2)
ALBUMIN/GLOB SERPL: 1.4 G/DL
ALP SERPL-CCNC: 79 U/L (ref 39–117)
ALT SERPL W P-5'-P-CCNC: 43 U/L (ref 1–41)
ANION GAP SERPL CALCULATED.3IONS-SCNC: 7.9 MMOL/L (ref 5–15)
AST SERPL-CCNC: 33 U/L (ref 1–40)
BASOPHILS # BLD AUTO: 0.04 10*3/MM3 (ref 0–0.2)
BASOPHILS NFR BLD AUTO: 0.6 % (ref 0–1.5)
BH CV LOW VAS LEFT GASTRONEMIUS VESSEL: 1
BH CV LOW VAS LEFT POSTERIOR TIBIAL VESSEL: 1
BH CV LOW VAS LEFT SOLEAL VESSEL: 1
BH CV LOW VAS RIGHT GASTRONEMIUS VESSEL: 1
BH CV LOWER VASCULAR LEFT COMMON FEMORAL AUGMENT: NORMAL
BH CV LOWER VASCULAR LEFT COMMON FEMORAL COMPETENT: NORMAL
BH CV LOWER VASCULAR LEFT COMMON FEMORAL COMPRESS: NORMAL
BH CV LOWER VASCULAR LEFT COMMON FEMORAL PHASIC: NORMAL
BH CV LOWER VASCULAR LEFT COMMON FEMORAL SPONT: NORMAL
BH CV LOWER VASCULAR LEFT DISTAL FEMORAL COMPRESS: NORMAL
BH CV LOWER VASCULAR LEFT GASTRONEMIUS COMPRESS: NORMAL
BH CV LOWER VASCULAR LEFT GASTRONEMIUS THROMBUS: NORMAL
BH CV LOWER VASCULAR LEFT GREATER SAPH AK COMPRESS: NORMAL
BH CV LOWER VASCULAR LEFT GREATER SAPH BK COMPRESS: NORMAL
BH CV LOWER VASCULAR LEFT LESSER SAPH COMPRESS: NORMAL
BH CV LOWER VASCULAR LEFT MID FEMORAL AUGMENT: NORMAL
BH CV LOWER VASCULAR LEFT MID FEMORAL COMPETENT: NORMAL
BH CV LOWER VASCULAR LEFT MID FEMORAL COMPRESS: NORMAL
BH CV LOWER VASCULAR LEFT MID FEMORAL PHASIC: NORMAL
BH CV LOWER VASCULAR LEFT MID FEMORAL SPONT: NORMAL
BH CV LOWER VASCULAR LEFT PERONEAL COMPRESS: NORMAL
BH CV LOWER VASCULAR LEFT POPLITEAL AUGMENT: NORMAL
BH CV LOWER VASCULAR LEFT POPLITEAL COMPETENT: NORMAL
BH CV LOWER VASCULAR LEFT POPLITEAL COMPRESS: NORMAL
BH CV LOWER VASCULAR LEFT POPLITEAL PHASIC: NORMAL
BH CV LOWER VASCULAR LEFT POPLITEAL SPONT: NORMAL
BH CV LOWER VASCULAR LEFT POSTERIOR TIBIAL COMPRESS: NORMAL
BH CV LOWER VASCULAR LEFT POSTERIOR TIBIAL THROMBUS: NORMAL
BH CV LOWER VASCULAR LEFT PROFUNDA FEMORAL COMPRESS: NORMAL
BH CV LOWER VASCULAR LEFT PROXIMAL FEMORAL COMPRESS: NORMAL
BH CV LOWER VASCULAR LEFT SAPHENOFEMORAL JUNCTION COMPRESS: NORMAL
BH CV LOWER VASCULAR LEFT SOLEAL COMPRESS: NORMAL
BH CV LOWER VASCULAR LEFT SOLEAL THROMBUS: NORMAL
BH CV LOWER VASCULAR RIGHT COMMON FEMORAL AUGMENT: NORMAL
BH CV LOWER VASCULAR RIGHT COMMON FEMORAL COMPETENT: NORMAL
BH CV LOWER VASCULAR RIGHT COMMON FEMORAL COMPRESS: NORMAL
BH CV LOWER VASCULAR RIGHT COMMON FEMORAL PHASIC: NORMAL
BH CV LOWER VASCULAR RIGHT COMMON FEMORAL SPONT: NORMAL
BH CV LOWER VASCULAR RIGHT DISTAL FEMORAL COMPRESS: NORMAL
BH CV LOWER VASCULAR RIGHT GASTRONEMIUS COMPRESS: NORMAL
BH CV LOWER VASCULAR RIGHT GASTRONEMIUS THROMBUS: NORMAL
BH CV LOWER VASCULAR RIGHT GREATER SAPH AK COMPRESS: NORMAL
BH CV LOWER VASCULAR RIGHT GREATER SAPH BK COMPRESS: NORMAL
BH CV LOWER VASCULAR RIGHT LESSER SAPH COMPRESS: NORMAL
BH CV LOWER VASCULAR RIGHT MID FEMORAL AUGMENT: NORMAL
BH CV LOWER VASCULAR RIGHT MID FEMORAL COMPETENT: NORMAL
BH CV LOWER VASCULAR RIGHT MID FEMORAL COMPRESS: NORMAL
BH CV LOWER VASCULAR RIGHT MID FEMORAL PHASIC: NORMAL
BH CV LOWER VASCULAR RIGHT MID FEMORAL SPONT: NORMAL
BH CV LOWER VASCULAR RIGHT PERONEAL COMPRESS: NORMAL
BH CV LOWER VASCULAR RIGHT POPLITEAL AUGMENT: NORMAL
BH CV LOWER VASCULAR RIGHT POPLITEAL COMPETENT: NORMAL
BH CV LOWER VASCULAR RIGHT POPLITEAL COMPRESS: NORMAL
BH CV LOWER VASCULAR RIGHT POPLITEAL PHASIC: NORMAL
BH CV LOWER VASCULAR RIGHT POPLITEAL SPONT: NORMAL
BH CV LOWER VASCULAR RIGHT POSTERIOR TIBIAL COMPRESS: NORMAL
BH CV LOWER VASCULAR RIGHT POSTERIOR TIBIAL THROMBUS: NORMAL
BH CV LOWER VASCULAR RIGHT PROFUNDA FEMORAL COMPRESS: NORMAL
BH CV LOWER VASCULAR RIGHT PROXIMAL FEMORAL COMPRESS: NORMAL
BH CV LOWER VASCULAR RIGHT SAPHENOFEMORAL JUNCTION COMPRESS: NORMAL
BH CV LOWER VASCULAR RIGHT SOLEAL COMPRESS: NORMAL
BILIRUB SERPL-MCNC: 0.5 MG/DL (ref 0–1.2)
BUN SERPL-MCNC: 15 MG/DL (ref 6–20)
BUN/CREAT SERPL: 11.9 (ref 7–25)
CALCIUM SPEC-SCNC: 10.3 MG/DL (ref 8.6–10.5)
CHLORIDE SERPL-SCNC: 101 MMOL/L (ref 98–107)
CO2 SERPL-SCNC: 30.1 MMOL/L (ref 22–29)
CREAT SERPL-MCNC: 1.26 MG/DL (ref 0.76–1.27)
D DIMER PPP FEU-MCNC: <0.27 MCGFEU/ML (ref 0–0.49)
DEPRECATED RDW RBC AUTO: 37.2 FL (ref 37–54)
EGFRCR SERPLBLD CKD-EPI 2021: 75.8 ML/MIN/1.73
EOSINOPHIL # BLD AUTO: 0.13 10*3/MM3 (ref 0–0.4)
EOSINOPHIL NFR BLD AUTO: 1.8 % (ref 0.3–6.2)
ERYTHROCYTE [DISTWIDTH] IN BLOOD BY AUTOMATED COUNT: 11.9 % (ref 12.3–15.4)
GLOBULIN UR ELPH-MCNC: 3.1 GM/DL
GLUCOSE SERPL-MCNC: 106 MG/DL (ref 65–99)
HCT VFR BLD AUTO: 43.3 % (ref 37.5–51)
HGB BLD-MCNC: 14.6 G/DL (ref 13–17.7)
IMM GRANULOCYTES # BLD AUTO: 0.02 10*3/MM3 (ref 0–0.05)
IMM GRANULOCYTES NFR BLD AUTO: 0.3 % (ref 0–0.5)
LYMPHOCYTES # BLD AUTO: 2.19 10*3/MM3 (ref 0.7–3.1)
LYMPHOCYTES NFR BLD AUTO: 30.3 % (ref 19.6–45.3)
MAXIMAL PREDICTED HEART RATE: 184 BPM
MCH RBC QN AUTO: 29 PG (ref 26.6–33)
MCHC RBC AUTO-ENTMCNC: 33.7 G/DL (ref 31.5–35.7)
MCV RBC AUTO: 85.9 FL (ref 79–97)
MONOCYTES # BLD AUTO: 0.52 10*3/MM3 (ref 0.1–0.9)
MONOCYTES NFR BLD AUTO: 7.2 % (ref 5–12)
NEUTROPHILS NFR BLD AUTO: 4.32 10*3/MM3 (ref 1.7–7)
NEUTROPHILS NFR BLD AUTO: 59.8 % (ref 42.7–76)
NRBC BLD AUTO-RTO: 0 /100 WBC (ref 0–0.2)
PLATELET # BLD AUTO: 310 10*3/MM3 (ref 140–450)
PMV BLD AUTO: 9.4 FL (ref 6–12)
POTASSIUM SERPL-SCNC: 4 MMOL/L (ref 3.5–5.2)
PROT SERPL-MCNC: 7.5 G/DL (ref 6–8.5)
RBC # BLD AUTO: 5.04 10*6/MM3 (ref 4.14–5.8)
SODIUM SERPL-SCNC: 139 MMOL/L (ref 136–145)
STRESS TARGET HR: 156 BPM
WBC NRBC COR # BLD: 7.22 10*3/MM3 (ref 3.4–10.8)

## 2022-11-15 PROCEDURE — 83520 IMMUNOASSAY QUANT NOS NONAB: CPT | Performed by: INTERNAL MEDICINE

## 2022-11-15 PROCEDURE — 86148 ANTI-PHOSPHOLIPID ANTIBODY: CPT | Performed by: INTERNAL MEDICINE

## 2022-11-15 PROCEDURE — 85025 COMPLETE CBC W/AUTO DIFF WBC: CPT | Performed by: INTERNAL MEDICINE

## 2022-11-15 PROCEDURE — 85379 FIBRIN DEGRADATION QUANT: CPT | Performed by: INTERNAL MEDICINE

## 2022-11-15 PROCEDURE — 93970 EXTREMITY STUDY: CPT

## 2022-11-15 PROCEDURE — 36415 COLL VENOUS BLD VENIPUNCTURE: CPT

## 2022-11-15 PROCEDURE — 80053 COMPREHEN METABOLIC PANEL: CPT | Performed by: INTERNAL MEDICINE

## 2022-11-17 ENCOUNTER — OFFICE VISIT (OUTPATIENT)
Dept: FAMILY MEDICINE CLINIC | Facility: CLINIC | Age: 37
End: 2022-11-17

## 2022-11-17 VITALS
OXYGEN SATURATION: 95 % | DIASTOLIC BLOOD PRESSURE: 81 MMHG | SYSTOLIC BLOOD PRESSURE: 144 MMHG | TEMPERATURE: 97.8 F | BODY MASS INDEX: 41.15 KG/M2 | HEART RATE: 64 BPM | WEIGHT: 262.8 LBS

## 2022-11-17 DIAGNOSIS — R03.0 ELEVATED BLOOD PRESSURE READING WITHOUT DIAGNOSIS OF HYPERTENSION: ICD-10-CM

## 2022-11-17 DIAGNOSIS — E66.01 CLASS 3 SEVERE OBESITY DUE TO EXCESS CALORIES WITHOUT SERIOUS COMORBIDITY WITH BODY MASS INDEX (BMI) OF 40.0 TO 44.9 IN ADULT: ICD-10-CM

## 2022-11-17 DIAGNOSIS — R06.83 SNORING: ICD-10-CM

## 2022-11-17 DIAGNOSIS — Z00.00 HEALTH MAINTENANCE EXAMINATION: Primary | ICD-10-CM

## 2022-11-17 PROCEDURE — 2014F MENTAL STATUS ASSESS: CPT | Performed by: NURSE PRACTITIONER

## 2022-11-17 PROCEDURE — 99395 PREV VISIT EST AGE 18-39: CPT | Performed by: NURSE PRACTITIONER

## 2022-11-17 PROCEDURE — 3008F BODY MASS INDEX DOCD: CPT | Performed by: NURSE PRACTITIONER

## 2022-11-17 RX ORDER — TIRZEPATIDE 2.5 MG/.5ML
INJECTION, SOLUTION SUBCUTANEOUS
Qty: 2 ML | Refills: 2 | Status: SHIPPED | OUTPATIENT
Start: 2022-11-17 | End: 2023-02-15

## 2022-11-17 RX ORDER — DEXTROAMPHETAMINE SULFATE, DEXTROAMPHETAMINE SACCHARATE, AMPHETAMINE SULFATE AND AMPHETAMINE ASPARTATE 5; 5; 5; 5 MG/1; MG/1; MG/1; MG/1
20 CAPSULE, EXTENDED RELEASE ORAL DAILY
COMMUNITY
Start: 2022-08-25

## 2022-11-17 NOTE — PROGRESS NOTES
Subjective   Ruddy Luu is a 36 y.o. male.     Chief Complaint   Patient presents with   • Sleep Apnea       History of Present Illness   The patient is being seen for a health maintenance evaluation.  The last health maintenance visit was last year.  Social history: Household members include spouse.  He is .  Work status: Full-time.  The patient has never smoked cigarettes.  He reports no alcohol use.  General health: The patient's health is described as good.  He does not have regular dental visits.  The patient brushes daily, flosses daily and reports his last dental visit is unknown.  He denies vision problems.  Vision care includes no need for vision correction and no recent eye exam.  He denies hearing loss.  Immunization status: COVID, Tdap and influenza vaccinations needed.  Lifestyle: He exercises regularly.  Reproductive health: He is sexually active.    The following portions of the patient's history were reviewed and updated as appropriate: allergies, current medications, past family history, past medical history, past social history, past surgical history and problem list.    Past Medical History:   Diagnosis Date   • History of DVT (deep vein thrombosis) 04/2022    Left lower extremity, calf muscle vein   • Infectious mononucleosis    • No pertinent past medical history        History reviewed. No pertinent surgical history.    History reviewed. No pertinent family history.    Social History     Socioeconomic History   • Marital status:    Tobacco Use   • Smoking status: Never   • Smokeless tobacco: Never   Substance and Sexual Activity   • Alcohol use: Yes     Comment: rarely   • Drug use: Never       Review of Systems   Constitutional: Negative for fever.   HENT: Negative for ear pain, rhinorrhea and sore throat.    Eyes: Negative for visual disturbance.   Respiratory: Negative for cough and shortness of breath.    Cardiovascular: Negative for chest pain.   Gastrointestinal:  Negative for abdominal pain, diarrhea, nausea and vomiting.   Genitourinary: Negative.    Musculoskeletal: Negative.    Skin: Negative for rash.   Neurological: Negative for dizziness and headache.   Psychiatric/Behavioral: Negative for depressed mood.       Objective   Vitals:    11/17/22 1548   BP: 144/81   BP Location: Left arm   Patient Position: Sitting   Cuff Size: Large Adult   Pulse: 64   Temp: 97.8 °F (36.6 °C)   TempSrc: Temporal   SpO2: 95%   Weight: 119 kg (262 lb 12.8 oz)      Body mass index is 41.15 kg/m².  Physical Exam  Vitals and nursing note reviewed.   Constitutional:       Appearance: Normal appearance.   HENT:      Head: Normocephalic and atraumatic.      Right Ear: Tympanic membrane and ear canal normal.      Left Ear: Tympanic membrane and ear canal normal.   Eyes:      Pupils: Pupils are equal, round, and reactive to light.   Cardiovascular:      Rate and Rhythm: Normal rate and regular rhythm.      Heart sounds: Normal heart sounds.   Pulmonary:      Effort: Pulmonary effort is normal.      Breath sounds: Normal breath sounds.   Abdominal:      General: Bowel sounds are normal.      Palpations: Abdomen is soft.      Tenderness: There is no abdominal tenderness.   Genitourinary:     Comments: Declined   Musculoskeletal:         General: Normal range of motion.      Cervical back: Neck supple.   Skin:     General: Skin is warm and dry.   Neurological:      Mental Status: He is alert and oriented to person, place, and time.   Psychiatric:         Mood and Affect: Mood normal.           Assessment & Plan   Diagnoses and all orders for this visit:    1. Health maintenance examination (Primary)    2. Snoring  -     Ambulatory Referral to Sleep Medicine    3. Elevated blood pressure reading without diagnosis of hypertension    4. Class 3 severe obesity due to excess calories without serious comorbidity with body mass index (BMI) of 40.0 to 44.9 in adult ([facility]ly [facility])  -     Tirzepatide (Mounjaro) 2.5  MG/0.5ML solution pen-injector; Inject 0.25 mg under the skin into the appropriate area as directed 1 (One) Time Per Week for 30 days, THEN 0.5 mg 1 (One) Time Per Week for 60 days.  Dispense: 2 mL; Refill: 2    Impression: Currently, he has an adequate exercise regimen.  No lab work is due at this time.  Patient declines vaccinations at this time.  Advice and education were given regarding diet.

## 2022-11-18 LAB
PS IGG SER-ACNC: 9 UNITS (ref 0–30)
PS IGM SER-ACNC: <10 UNITS (ref 0–30)

## 2022-11-21 ENCOUNTER — DOCUMENTATION (OUTPATIENT)
Dept: FAMILY MEDICINE CLINIC | Facility: CLINIC | Age: 37
End: 2022-11-21

## 2022-11-22 ENCOUNTER — APPOINTMENT (OUTPATIENT)
Dept: OTHER | Facility: HOSPITAL | Age: 37
End: 2022-11-22

## 2022-11-22 ENCOUNTER — TELEPHONE (OUTPATIENT)
Dept: ONCOLOGY | Facility: CLINIC | Age: 37
End: 2022-11-22

## 2022-11-22 NOTE — TELEPHONE ENCOUNTER
Caller: ALECIA    Relationship to patient: WIFE    Best call back number: [phone] (WIFE)    [phone] (PATIENT)    Patient is needing: TO CALL AND SEE IF WHAT THEY ARE READING IN RESULTS IF PT HAS NEW BLOOD CLOT. IF SO, WHAT WOULD BE THE NEXT STEPS.    PT'S WIFE STATES PT WANTED TO CANCEL THE APPT THIS MORNING BECAUSE HE DIDN'T SLEEP WELL LAST NIGHT. HUB WT TO [place].

## 2022-11-22 NOTE — TELEPHONE ENCOUNTER
Called back patient's spouse and made aware that Dr Javed will be discussing test results on his upcoming visit tomorrow, made aware that there were no acute DVT in the most recent study, there were new findings but is noted as chronic, reiterated that Dr Javed will discuss in patient's upcoming visit mason. Spouse v/u.

## 2022-11-23 ENCOUNTER — OFFICE VISIT (OUTPATIENT)
Dept: ONCOLOGY | Facility: CLINIC | Age: 37
End: 2022-11-23

## 2022-11-23 ENCOUNTER — APPOINTMENT (OUTPATIENT)
Dept: LAB | Facility: HOSPITAL | Age: 37
End: 2022-11-23

## 2022-11-23 VITALS
HEIGHT: 67 IN | OXYGEN SATURATION: 97 % | RESPIRATION RATE: 18 BRPM | BODY MASS INDEX: 41.39 KG/M2 | WEIGHT: 263.7 LBS | SYSTOLIC BLOOD PRESSURE: 148 MMHG | HEART RATE: 68 BPM | TEMPERATURE: 97.8 F | DIASTOLIC BLOOD PRESSURE: 89 MMHG

## 2022-11-23 DIAGNOSIS — I82.552 CHRONIC DEEP VEIN THROMBOSIS (DVT) OF LEFT PERONEAL VEIN: ICD-10-CM

## 2022-11-23 DIAGNOSIS — I82.462 ACUTE DEEP VEIN THROMBOSIS (DVT) OF CALF MUSCLE VEIN OF LEFT LOWER EXTREMITY: Primary | ICD-10-CM

## 2022-11-23 PROCEDURE — 99214 OFFICE O/P EST MOD 30 MIN: CPT | Performed by: INTERNAL MEDICINE

## 2022-11-23 NOTE — PROGRESS NOTES
Subjective     REASON FOR FOLLOW UP: Left lower extremity calf vein DVT    HISTORY OF PRESENT ILLNESS:  The patient is a 37 y.o. year old male who is a generally healthy young man with no chronic medical problems.  He presented to the emergency room on 4/1/2022 with pain in the left lower extremity.  He underwent a venous Doppler study acute DVT in the posterior tibial, gastrocnemius, and soleal veins.  He was started on Eliquis 10 mg p.o. twice daily x7 days to be followed by 5 mg twice daily thereafter.    He was seen for initial consult in our office 4/5/2022 to undergo a hypercoagulable work-up to try to explain the development of this clot.  He does have a family history of blood clots in his mother who had DVTs.  He has not had any significant injury or surgery on the leg.  He had recently traveled to [place] in a car but had gotten out every couple of hours.  He is not on any hormonal therapies.  He has no history of malignancy.    We reviewed the results of the hypercoagulable work-up and explained that we do not see any signs of an acquired or inherited thrombophilic defect based on these labs.  We will elected to continue Eliquis for another 3 months and see him back in the office with repeat Doppler study.     INTERVAL HISTORY:  He returns today for  3-month follow-up and review of Doppler study which was performed on 11/15/2022.  He continues to do well on Eliquis.  The Doppler report as noted below now shows chronic DVT in bilateral calf veins.  He reports the pain and swelling in the calf have improved significantly.  He is not having any bleeding or bruising issues on Eliquis.    Ruddy looks great in the office today.  He reports he is not having pain or swelling in either leg at this point.  He has not been wearing his compression stockings faithfully and we again encouraged him to do so.    We discussed likely latest Doppler study which is somewhat perplexing as they are mentioning a chronic  right calf clot which was never mentioned before on his prior studies.  As noted above he has not symptomatic and his D-dimer was negative.    We discussed the pros and cons of continuing Eliquis versus stopping Eliquis and at this time he felt more comfortable remaining on the Eliquis and getting another Doppler study down the road.    History of Present Illness     Past Medical History:   Diagnosis Date   • History of DVT (deep vein thrombosis) 04/2022    Left lower extremity, calf muscle vein   • Infectious mononucleosis    • No pertinent past medical history         No past surgical history on file.     Current Outpatient Medications on File Prior to Visit   Medication Sig Dispense Refill   • Adderall XR 20 MG 24 hr capsule Take 20 mg by mouth Daily     • buPROPion XL (WELLBUTRIN XL) 300 MG 24 hr tablet      • Eliquis 5 MG tablet tablet TAKE ONE TABLET BY MOUTH EVERY 12 HOURS 60 tablet 3   • Tirzepatide (Mounjaro) 2.5 MG/0.5ML solution pen-injector Inject 0.25 mg under the skin into the appropriate area as directed 1 (One) Time Per Week for 30 days, THEN 0.5 mg 1 (One) Time Per Week for 60 days. 2 mL 2   • zolpidem (AMBIEN) 10 MG tablet        No current facility-administered medications on file prior to visit.        ALLERGIES:    Allergies   Allergen Reactions   • Penicillins Other (See Comments)     Unknown, allergic as a child        Social History     Socioeconomic History   • Marital status:    Tobacco Use   • Smoking status: Never   • Smokeless tobacco: Never   Substance and Sexual Activity   • Alcohol use: Yes     Comment: rarely   • Drug use: Never        No family history on file.     Review of Systems   Constitutional: Positive for activity change. Negative for chills, fatigue and fever.   HENT: Negative for mouth sores, trouble swallowing and voice change.    Eyes: Negative for pain and visual disturbance.   Respiratory: Negative for cough, shortness of breath and wheezing.    Cardiovascular:  "Negative for chest pain and palpitations.   Gastrointestinal: Negative for abdominal pain, constipation, diarrhea, nausea and vomiting.   Genitourinary: Negative for difficulty urinating, frequency and urgency.   Musculoskeletal: Negative for arthralgias and joint swelling.        Left calf pain and swelling   Skin: Negative for rash.   Neurological: Negative for dizziness, seizures, weakness and headaches.   Hematological: Negative for adenopathy. Does not bruise/bleed easily.   Psychiatric/Behavioral: Negative for behavioral problems and confusion. The patient is not nervous/anxious.         Objective     Vitals:    11/23/22 1011   BP: 148/89   Pulse: 68   Resp: 18   Temp: 97.8 °F (36.6 °C)   TempSrc: Temporal   SpO2: 97%   Weight: 120 kg (263 lb 11.2 oz)   Height: 170.2 cm (67.01\")   PainSc: 0-No pain     Current Status 11/23/2022   ECOG score 0       Physical Exam  Constitutional:       General: He is not in acute distress.     Appearance: He is well-developed.   HENT:      Head: Normocephalic.   Eyes:      General: No scleral icterus.     Conjunctiva/sclera: Conjunctivae normal.      Pupils: Pupils are equal, round, and reactive to light.   Neck:      Thyroid: No thyromegaly.      Vascular: No JVD.   Cardiovascular:      Rate and Rhythm: Normal rate and regular rhythm.      Heart sounds: No murmur heard.    No friction rub. No gallop.   Pulmonary:      Effort: Pulmonary effort is normal.      Breath sounds: Normal breath sounds. No wheezing or rales.   Abdominal:      General: There is no distension.      Palpations: Abdomen is soft. There is no mass.      Tenderness: There is no abdominal tenderness.   Musculoskeletal:         General: No deformity. Normal range of motion.      Cervical back: Normal range of motion and neck supple.   Lymphadenopathy:      Cervical: No cervical adenopathy.   Skin:     General: Skin is warm and dry.      Findings: No erythema or rash.   Neurological:      Mental Status: He is " alert and oriented to person, place, and time.      Cranial Nerves: No cranial nerve deficit.      Deep Tendon Reflexes: Reflexes are normal and symmetric.   Psychiatric:         Behavior: Behavior normal.         Judgment: Judgment normal.           RECENT LABS:  Hematology WBC   Date Value Ref Range Status   11/15/2022 7.22 3.40 - 10.80 10*3/mm3 Final   04/30/2021 6.24 3.40 - 10.80 10*3/mm3 Final     RBC   Date Value Ref Range Status   11/15/2022 5.04 4.14 - 5.80 10*6/mm3 Final   04/30/2021 4.84 4.14 - 5.80 10*6/mm3 Final     Hemoglobin   Date Value Ref Range Status   11/15/2022 14.6 13.0 - 17.7 g/dL Final     Hematocrit   Date Value Ref Range Status   11/15/2022 43.3 37.5 - 51.0 % Final     Platelets   Date Value Ref Range Status   11/15/2022 310 140 - 450 10*3/mm3 Final          11/15/2022  D-Dimer, Quantitative   0.00 - 0.49 MCGFEU/mL <0.27          DOPPLER 11/15/2022  Interpretation Summary       •  Chronic right lower extremity deep vein thrombosis noted in the posterior tibial and gastrocnemius.  •  Chronic left lower extremity deep vein thrombosis noted in the posterial tibial, gastrocnemius and soleal.  •  All other veins appeared normal bilaterally.    DOPPLER 7/26/2022  Interpretation Summary    · Chronic left lower extremity deep vein thrombosis noted in the posterial tibial, gastrocnemius and soleal.  · All other veins appeared normal bilaterally.    VENOUS DOPPLER 4/1/2022  Interpretation Summary    · Acute left lower extremity deep vein thrombosis noted in the posterial tibial, gastrocnemius and soleal.  · All other left sided veins appeared normal.        Assessment & Plan   1.  Acute left calf DVT and is otherwise healthy 36-year-old gentleman with no obvious predisposing risk factors other than moderate obesity.  2.  Family history of DVTs in his mother.  3.  Laboratory hypercoagulable work-up was unremarkable with no evidence of inherited or acquired thrombophilic defects.  4.  Latest Doppler  showing chronic DVT in the calves bilaterally.  Patient's symptoms have improved and he never had any symptoms of clot in the right lower extremity.    PLAN  1.  We reviewed the results of the Doppler study with the patient and provided him a printed copy of the report.  2.  We had a fairly lengthy discussion regarding the pros and cons of stopping Eliquis at this point.  We were a little uncomfortable recommending discontinuation of Eliquis with the latest report showing right calf DVT which was never mentioned before.  He certainly is not having any symptoms of problems in the right leg.  After our discussion he seemed more comfortable continuing Eliquis for now.  3.  We recommended continuing to wear compression stockings.  4.  MD follow-up in 4 months with repeat Doppler studies of the lower extremities bilaterally performed 1 week prior to that visit.  5.  Depending on those results we will decide on the next visit whether or not he can discontinue Eliquis at that point.

## 2022-12-16 LAB
ANTI-PHOSPHATIDIC ACID: NORMAL
ANTI-PHOSPHATIDYL GLYCEROL: NORMAL
ANTI-PHOSPHATIDYL INOSITOL: NORMAL
ANTI-PHOSPHATIDYLETHANOLAMINE: NORMAL
PE IGA SER-ACNC: 0.9 U/ML
PE IGG SER-ACNC: 0.9 U/ML
PE IGM SER-ACNC: 0.5 U/ML
PG IGA SER-ACNC: 2.2 U/ML
PG IGG SER-ACNC: 2.6 U/ML
PG IGM SER-ACNC: 1.5 U/ML
PHOSPHATIDATE IGA SER-ACNC: 2.8 U/ML
PHOSPHATIDATE IGG SER-ACNC: 2.5 U/ML
PHOSPHATIDATE IGM SER-ACNC: 1.9 U/ML
PI IGA SER-ACNC: NORMAL U/ML
PI IGG SER-ACNC: 1.9 U/ML
PI IGM SER-ACNC: 1.5 U/ML

## 2023-03-15 ENCOUNTER — APPOINTMENT (OUTPATIENT)
Dept: CARDIOLOGY | Facility: HOSPITAL | Age: 38
End: 2023-03-15

## 2023-03-15 NOTE — TELEPHONE ENCOUNTER
Caller: lamonte ralphn    Relationship: Emergency Contact    Best call back number: [phone]    Requested Prescriptions:   Requested Prescriptions     Pending Prescriptions Disp Refills   • apixaban (Eliquis) 5 MG tablet tablet 60 tablet 3     Sig: Take 1 tablet by mouth Every 12 (Twelve) Hours.        Pharmacy where request should be sent: [facility] [id number] [facility] [id number] [facility] AT [facility] [phone] [facility] [phone] FX     Additional details provided by patient: HE HAS ABOUT 2 PILLS LEFT.    Does the patient have less than a 3 day supply:  [x] Yes  [] No    Would you like a call back once the refill request has been completed: [] Yes [x] No    If the office needs to give you a call back, can they leave a voicemail: [] Yes [x] No    Lissa Sterling Rep   03/15/23 11:10 EDT

## 2023-03-22 ENCOUNTER — HOSPITAL ENCOUNTER (OUTPATIENT)
Dept: CARDIOLOGY | Facility: HOSPITAL | Age: 38
Discharge: HOME | End: 2023-03-22
Payer: COMMERCIAL

## 2023-03-22 ENCOUNTER — LAB (OUTPATIENT)
Dept: LAB | Facility: HOSPITAL | Age: 38
End: 2023-03-22
Payer: COMMERCIAL

## 2023-03-22 DIAGNOSIS — I82.552 CHRONIC DEEP VEIN THROMBOSIS (DVT) OF LEFT PERONEAL VEIN: ICD-10-CM

## 2023-03-22 DIAGNOSIS — I82.462 ACUTE DEEP VEIN THROMBOSIS (DVT) OF CALF MUSCLE VEIN OF LEFT LOWER EXTREMITY: ICD-10-CM

## 2023-03-22 LAB
BASOPHILS # BLD AUTO: 0.04 10*3/MM3 (ref 0–0.2)
BASOPHILS NFR BLD AUTO: 0.5 % (ref 0–1.5)
BH CV LOW VAS LEFT POSTERIOR TIBIAL VESSEL: 1
BH CV LOW VAS RIGHT POSTERIOR TIBIAL VESSEL: 1
BH CV LOWER VASCULAR LEFT COMMON FEMORAL AUGMENT: NORMAL
BH CV LOWER VASCULAR LEFT COMMON FEMORAL COMPETENT: NORMAL
BH CV LOWER VASCULAR LEFT COMMON FEMORAL COMPRESS: NORMAL
BH CV LOWER VASCULAR LEFT COMMON FEMORAL PHASIC: NORMAL
BH CV LOWER VASCULAR LEFT COMMON FEMORAL SPONT: NORMAL
BH CV LOWER VASCULAR LEFT DISTAL FEMORAL COMPRESS: NORMAL
BH CV LOWER VASCULAR LEFT GASTRONEMIUS COMPRESS: NORMAL
BH CV LOWER VASCULAR LEFT GREATER SAPH AK COMPRESS: NORMAL
BH CV LOWER VASCULAR LEFT GREATER SAPH BK COMPRESS: NORMAL
BH CV LOWER VASCULAR LEFT LESSER SAPH COMPRESS: NORMAL
BH CV LOWER VASCULAR LEFT MID FEMORAL AUGMENT: NORMAL
BH CV LOWER VASCULAR LEFT MID FEMORAL COMPETENT: NORMAL
BH CV LOWER VASCULAR LEFT MID FEMORAL COMPRESS: NORMAL
BH CV LOWER VASCULAR LEFT MID FEMORAL PHASIC: NORMAL
BH CV LOWER VASCULAR LEFT MID FEMORAL SPONT: NORMAL
BH CV LOWER VASCULAR LEFT PERONEAL COMPRESS: NORMAL
BH CV LOWER VASCULAR LEFT POPLITEAL AUGMENT: NORMAL
BH CV LOWER VASCULAR LEFT POPLITEAL COMPETENT: NORMAL
BH CV LOWER VASCULAR LEFT POPLITEAL COMPRESS: NORMAL
BH CV LOWER VASCULAR LEFT POPLITEAL PHASIC: NORMAL
BH CV LOWER VASCULAR LEFT POPLITEAL SPONT: NORMAL
BH CV LOWER VASCULAR LEFT POSTERIOR TIBIAL COMPRESS: NORMAL
BH CV LOWER VASCULAR LEFT POSTERIOR TIBIAL THROMBUS: NORMAL
BH CV LOWER VASCULAR LEFT PROFUNDA FEMORAL COMPRESS: NORMAL
BH CV LOWER VASCULAR LEFT PROXIMAL FEMORAL COMPRESS: NORMAL
BH CV LOWER VASCULAR LEFT SAPHENOFEMORAL JUNCTION COMPRESS: NORMAL
BH CV LOWER VASCULAR LEFT SOLEAL COMPRESS: NORMAL
BH CV LOWER VASCULAR RIGHT COMMON FEMORAL AUGMENT: NORMAL
BH CV LOWER VASCULAR RIGHT COMMON FEMORAL COMPETENT: NORMAL
BH CV LOWER VASCULAR RIGHT COMMON FEMORAL COMPRESS: NORMAL
BH CV LOWER VASCULAR RIGHT COMMON FEMORAL PHASIC: NORMAL
BH CV LOWER VASCULAR RIGHT COMMON FEMORAL SPONT: NORMAL
BH CV LOWER VASCULAR RIGHT DISTAL FEMORAL COMPRESS: NORMAL
BH CV LOWER VASCULAR RIGHT GASTRONEMIUS COMPRESS: NORMAL
BH CV LOWER VASCULAR RIGHT GREATER SAPH AK COMPRESS: NORMAL
BH CV LOWER VASCULAR RIGHT GREATER SAPH BK COMPRESS: NORMAL
BH CV LOWER VASCULAR RIGHT LESSER SAPH COMPRESS: NORMAL
BH CV LOWER VASCULAR RIGHT MID FEMORAL AUGMENT: NORMAL
BH CV LOWER VASCULAR RIGHT MID FEMORAL COMPETENT: NORMAL
BH CV LOWER VASCULAR RIGHT MID FEMORAL COMPRESS: NORMAL
BH CV LOWER VASCULAR RIGHT MID FEMORAL PHASIC: NORMAL
BH CV LOWER VASCULAR RIGHT MID FEMORAL SPONT: NORMAL
BH CV LOWER VASCULAR RIGHT PERONEAL COMPRESS: NORMAL
BH CV LOWER VASCULAR RIGHT POPLITEAL AUGMENT: NORMAL
BH CV LOWER VASCULAR RIGHT POPLITEAL COMPETENT: NORMAL
BH CV LOWER VASCULAR RIGHT POPLITEAL COMPRESS: NORMAL
BH CV LOWER VASCULAR RIGHT POPLITEAL PHASIC: NORMAL
BH CV LOWER VASCULAR RIGHT POPLITEAL SPONT: NORMAL
BH CV LOWER VASCULAR RIGHT POSTERIOR TIBIAL COMPRESS: NORMAL
BH CV LOWER VASCULAR RIGHT POSTERIOR TIBIAL THROMBUS: NORMAL
BH CV LOWER VASCULAR RIGHT PROFUNDA FEMORAL COMPRESS: NORMAL
BH CV LOWER VASCULAR RIGHT PROXIMAL FEMORAL COMPRESS: NORMAL
BH CV LOWER VASCULAR RIGHT SAPHENOFEMORAL JUNCTION COMPRESS: NORMAL
DEPRECATED RDW RBC AUTO: 38.3 FL (ref 37–54)
EOSINOPHIL # BLD AUTO: 0.16 10*3/MM3 (ref 0–0.4)
EOSINOPHIL NFR BLD AUTO: 2.2 % (ref 0.3–6.2)
ERYTHROCYTE [DISTWIDTH] IN BLOOD BY AUTOMATED COUNT: 12.1 % (ref 12.3–15.4)
HCT VFR BLD AUTO: 44.5 % (ref 37.5–51)
HGB BLD-MCNC: 15.2 G/DL (ref 13–17.7)
IMM GRANULOCYTES # BLD AUTO: 0.02 10*3/MM3 (ref 0–0.05)
IMM GRANULOCYTES NFR BLD AUTO: 0.3 % (ref 0–0.5)
LYMPHOCYTES # BLD AUTO: 2.49 10*3/MM3 (ref 0.7–3.1)
LYMPHOCYTES NFR BLD AUTO: 34 % (ref 19.6–45.3)
MAXIMAL PREDICTED HEART RATE: 183 BPM
MCH RBC QN AUTO: 29.6 PG (ref 26.6–33)
MCHC RBC AUTO-ENTMCNC: 34.2 G/DL (ref 31.5–35.7)
MCV RBC AUTO: 86.7 FL (ref 79–97)
MONOCYTES # BLD AUTO: 0.48 10*3/MM3 (ref 0.1–0.9)
MONOCYTES NFR BLD AUTO: 6.6 % (ref 5–12)
NEUTROPHILS NFR BLD AUTO: 4.13 10*3/MM3 (ref 1.7–7)
NEUTROPHILS NFR BLD AUTO: 56.4 % (ref 42.7–76)
NRBC BLD AUTO-RTO: 0 /100 WBC (ref 0–0.2)
PLATELET # BLD AUTO: 312 10*3/MM3 (ref 140–450)
PMV BLD AUTO: 9.2 FL (ref 6–12)
RBC # BLD AUTO: 5.13 10*6/MM3 (ref 4.14–5.8)
STRESS TARGET HR: 156 BPM
WBC NRBC COR # BLD: 7.32 10*3/MM3 (ref 3.4–10.8)

## 2023-03-22 PROCEDURE — 85025 COMPLETE CBC W/AUTO DIFF WBC: CPT

## 2023-03-22 PROCEDURE — 93970 EXTREMITY STUDY: CPT

## 2023-03-22 PROCEDURE — 36415 COLL VENOUS BLD VENIPUNCTURE: CPT

## 2023-03-23 DIAGNOSIS — I82.462 ACUTE DEEP VEIN THROMBOSIS (DVT) OF CALF MUSCLE VEIN OF LEFT LOWER EXTREMITY: Primary | ICD-10-CM

## 2023-03-23 DIAGNOSIS — I82.552 CHRONIC DEEP VEIN THROMBOSIS (DVT) OF LEFT PERONEAL VEIN: ICD-10-CM

## 2023-03-24 ENCOUNTER — OFFICE VISIT (OUTPATIENT)
Dept: ONCOLOGY | Facility: CLINIC | Age: 38
End: 2023-03-24
Payer: COMMERCIAL

## 2023-03-24 VITALS
HEART RATE: 63 BPM | OXYGEN SATURATION: 97 % | RESPIRATION RATE: 18 BRPM | TEMPERATURE: 97.8 F | SYSTOLIC BLOOD PRESSURE: 145 MMHG | BODY MASS INDEX: 39.91 KG/M2 | DIASTOLIC BLOOD PRESSURE: 86 MMHG | HEIGHT: 67 IN | WEIGHT: 254.3 LBS

## 2023-03-24 DIAGNOSIS — I82.552 CHRONIC DEEP VEIN THROMBOSIS (DVT) OF LEFT PERONEAL VEIN: Primary | ICD-10-CM

## 2023-03-24 PROCEDURE — 1125F AMNT PAIN NOTED PAIN PRSNT: CPT | Performed by: INTERNAL MEDICINE

## 2023-03-24 PROCEDURE — 99214 OFFICE O/P EST MOD 30 MIN: CPT | Performed by: INTERNAL MEDICINE

## 2023-03-24 NOTE — PROGRESS NOTES
Subjective     REASON FOR FOLLOW UP: Left lower extremity calf vein DVT    HISTORY OF PRESENT ILLNESS:  The patient is a 37 y.o. year old male who is a generally healthy young man with no chronic medical problems.  He presented to the emergency room on 4/1/2022 with pain in the left lower extremity.  He underwent a venous Doppler study acute DVT in the posterior tibial, gastrocnemius, and soleal veins.  He was started on Eliquis 10 mg p.o. twice daily x7 days to be followed by 5 mg twice daily thereafter.    He was seen for initial consult in our office 4/5/2022 to undergo a hypercoagulable work-up to try to explain the development of this clot.  He does have a family history of blood clots in his mother who had DVTs.  He has not had any significant injury or surgery on the leg.  He had recently traveled to [place] in a car but had gotten out every couple of hours.  He is not on any hormonal therapies.  He has no history of malignancy.    We reviewed the results of the hypercoagulable work-up and explained that we do not see any signs of an acquired or inherited thrombophilic defect based on these labs.  We will elected to continue Eliquis for another 3 months and see him back in the office with repeat Doppler study.     INTERVAL HISTORY:  He returns today for  3-month follow-up and review of Doppler study which was performed on 11/15/2022.  He continues to do well on Eliquis.     INTERVAL HISTORY:  Ruddy looks great in the office today.  He reports he is not having pain or swelling in either leg at this point.  He has been trying to wear his compression stockings faithfully and we again encouraged him to do so.    We discussed the latest Doppler study 3/22/2023 which showed chronic DVT in the posterior tibial veins bilaterally.    We discussed the pros and cons of continuing Eliquis versus stopping Eliquis and at this time he felt more comfortable remaining on the Eliquis and getting another Doppler study in 6  months.    History of Present Illness     Past Medical History:   Diagnosis Date   • History of DVT (deep vein thrombosis) 04/2022    Left lower extremity, calf muscle vein   • Infectious mononucleosis    • No pertinent past medical history         No past surgical history on file.     Current Outpatient Medications on File Prior to Visit   Medication Sig Dispense Refill   • Adderall XR 20 MG 24 hr capsule Take 1 capsule by mouth Daily     • apixaban (Eliquis) 5 MG tablet tablet Take 1 tablet by mouth Every 12 (Twelve) Hours. 60 tablet 3   • buPROPion XL (WELLBUTRIN XL) 300 MG 24 hr tablet      • zolpidem (AMBIEN) 10 MG tablet        No current facility-administered medications on file prior to visit.        ALLERGIES:    Allergies   Allergen Reactions   • Penicillins Other (See Comments)     Unknown, allergic as a child        Social History     Socioeconomic History   • Marital status:    Tobacco Use   • Smoking status: Never   • Smokeless tobacco: Never   Substance and Sexual Activity   • Alcohol use: Yes     Comment: rarely   • Drug use: Never        No family history on file.     Review of Systems   Constitutional: Positive for activity change. Negative for chills, fatigue and fever.   HENT: Negative for mouth sores, trouble swallowing and voice change.    Eyes: Negative for pain and visual disturbance.   Respiratory: Negative for cough, shortness of breath and wheezing.    Cardiovascular: Negative for chest pain and palpitations.   Gastrointestinal: Negative for abdominal pain, constipation, diarrhea, nausea and vomiting.   Genitourinary: Negative for difficulty urinating, frequency and urgency.   Musculoskeletal: Negative for arthralgias and joint swelling.        Left calf pain and swelling   Skin: Negative for rash.   Neurological: Negative for dizziness, seizures, weakness and headaches.   Hematological: Negative for adenopathy. Does not bruise/bleed easily.   Psychiatric/Behavioral: Negative for  "behavioral problems and confusion. The patient is not nervous/anxious.         Objective     Vitals:    03/24/23 0856   BP: 145/86   Pulse: 63   Resp: 18   Temp: 97.8 °F (36.6 °C)   TempSrc: Temporal   SpO2: 97%   Weight: 115 kg (254 lb 4.8 oz)   Height: 170.2 cm (67.01\")   PainSc:   4   PainLoc: Leg     Current Status 3/24/2023   ECOG score 0       Physical Exam  Constitutional:       General: He is not in acute distress.     Appearance: He is well-developed.   HENT:      Head: Normocephalic.   Eyes:      General: No scleral icterus.     Conjunctiva/sclera: Conjunctivae normal.      Pupils: Pupils are equal, round, and reactive to light.   Neck:      Thyroid: No thyromegaly.      Vascular: No JVD.   Cardiovascular:      Rate and Rhythm: Normal rate and regular rhythm.      Heart sounds: No murmur heard.    No friction rub. No gallop.   Pulmonary:      Effort: Pulmonary effort is normal.      Breath sounds: Normal breath sounds. No wheezing or rales.   Abdominal:      General: There is no distension.      Palpations: Abdomen is soft. There is no mass.      Tenderness: There is no abdominal tenderness.   Musculoskeletal:         General: No deformity. Normal range of motion.      Cervical back: Normal range of motion and neck supple.   Lymphadenopathy:      Cervical: No cervical adenopathy.   Skin:     General: Skin is warm and dry.      Findings: No erythema or rash.   Neurological:      Mental Status: He is alert and oriented to person, place, and time.      Cranial Nerves: No cranial nerve deficit.      Deep Tendon Reflexes: Reflexes are normal and symmetric.   Psychiatric:         Behavior: Behavior normal.         Judgment: Judgment normal.           RECENT LABS:  Hematology WBC   Date Value Ref Range Status   03/22/2023 7.32 3.40 - 10.80 10*3/mm3 Final   04/30/2021 6.24 3.40 - 10.80 10*3/mm3 Final     RBC   Date Value Ref Range Status   03/22/2023 5.13 4.14 - 5.80 10*6/mm3 Final   04/30/2021 4.84 4.14 - 5.80 " 10*6/mm3 Final     Hemoglobin   Date Value Ref Range Status   03/22/2023 15.2 13.0 - 17.7 g/dL Final     Hematocrit   Date Value Ref Range Status   03/22/2023 44.5 37.5 - 51.0 % Final     Platelets   Date Value Ref Range Status   03/22/2023 312 140 - 450 10*3/mm3 Final          11/15/2022  D-Dimer, Quantitative   0.00 - 0.49 MCGFEU/mL <0.27          DOPPLER 3/22/2023  Interpretation Summary       •  Chronic right lower extremity deep vein thrombosis noted in the posterior tibial.  •  Chronic left lower extremity deep vein thrombosis noted in the posterial tibial.  •  All other veins appeared normal bilaterally.      DOPPLER 11/15/2022  Interpretation Summary       •  Chronic right lower extremity deep vein thrombosis noted in the posterior tibial and gastrocnemius.  •  Chronic left lower extremity deep vein thrombosis noted in the posterial tibial, gastrocnemius and soleal.  •  All other veins appeared normal bilaterally.    DOPPLER 7/26/2022  Interpretation Summary    · Chronic left lower extremity deep vein thrombosis noted in the posterial tibial, gastrocnemius and soleal.  · All other veins appeared normal bilaterally.    VENOUS DOPPLER 4/1/2022  Interpretation Summary    · Acute left lower extremity deep vein thrombosis noted in the posterial tibial, gastrocnemius and soleal.  · All other left sided veins appeared normal.        Assessment & Plan   1.  Acute left calf DVT and is otherwise healthy 36-year-old gentleman with no obvious predisposing risk factors other than moderate obesity.  2.  Family history of DVTs in his mother.  3.  Laboratory hypercoagulable work-up was unremarkable with no evidence of inherited or acquired thrombophilic defects.  4.  Latest Doppler 3/22/2023 showing chronic DVT in the calves bilaterally.  Patient's symptoms have improved and he never had any symptoms of clot in the right lower extremity.    PLAN  1.  We reviewed the results of the Doppler study with the patient and provided  him a printed copy of the report.  He has now been on Eliquis for nearly 12 months and Doppler is still showing chronic calf vein DVTs involving the posterior tibial veins bilaterally.  2.  We again had a fairly lengthy discussion regarding the pros and cons of stopping Eliquis at this point.  We mentioned that the chronic calf vein DVTs are not likely to embolize but they could slow flow in the lower extremities and potentially increase the risk of developing future acute DVTs.  After our discussion he seemed more comfortable continuing Eliquis for now.  3.  We recommended continuing to wear compression stockings.  He was given a new prescription for 4 pairs of below the knee stockings  4.  MD follow-up in 6 months with repeat Doppler studies of the lower extremities bilaterally and labs including a CBC, CMP, and D-dimer performed 1 week prior to that visit.

## 2023-07-24 ENCOUNTER — TELEPHONE (OUTPATIENT)
Dept: ONCOLOGY | Facility: HOSPITAL | Age: 38
End: 2023-07-24

## 2023-07-24 ENCOUNTER — TELEPHONE (OUTPATIENT)
Dept: ONCOLOGY | Facility: CLINIC | Age: 38
End: 2023-07-24

## 2023-07-24 DIAGNOSIS — M79.89 LEFT LEG SWELLING: ICD-10-CM

## 2023-07-24 DIAGNOSIS — I82.552 CHRONIC DEEP VEIN THROMBOSIS (DVT) OF LEFT PERONEAL VEIN: Primary | ICD-10-CM

## 2023-07-24 DIAGNOSIS — M79.605 LEFT LEG PAIN: ICD-10-CM

## 2023-07-24 NOTE — TELEPHONE ENCOUNTER
"Returned call to pt's wife with pt on the phone. Wife reports he had been wearing his compression stocking more frequently, he states he has had some swelling and it \"feels uncomfortable' for several weeks. He does reports missing doses of his Eliquis at night. Dr. Javed notified pt will have a left leg doppler. Informed pt's wife of the importance of taking his Eliquis. She v/u.   "

## 2023-07-24 NOTE — TELEPHONE ENCOUNTER
Caller: jonh ralph    Relationship: Emergency Contact    Best call back number: [phone]     Who are you requesting to speak with (clinical staff, provider,  specific staff member): CLINICAL    What was the call regarding: PATIENT HAS BEEN EXPERIENCING WARMTH AND DISCOMFORT IN LEG AND HAS REQUIRED WEARING COMPRESSION GARMENT MORE FREQUENTLY.    CALLING TO DISCUSS IF HE SHOULD COME IN FOR FURTHER EVALUATION

## 2023-07-25 ENCOUNTER — HOSPITAL ENCOUNTER (OUTPATIENT)
Dept: CARDIOLOGY | Facility: HOSPITAL | Age: 38
Discharge: HOME | End: 2023-07-25
Admitting: INTERNAL MEDICINE

## 2023-07-25 DIAGNOSIS — M79.89 LEFT LEG SWELLING: ICD-10-CM

## 2023-07-25 DIAGNOSIS — I82.552 CHRONIC DEEP VEIN THROMBOSIS (DVT) OF LEFT PERONEAL VEIN: ICD-10-CM

## 2023-07-25 DIAGNOSIS — M79.605 LEFT LEG PAIN: ICD-10-CM

## 2023-07-25 LAB
BH CV LOW VAS LEFT POSTERIOR TIBIAL VESSEL: 1
BH CV LOWER VASCULAR LEFT COMMON FEMORAL AUGMENT: NORMAL
BH CV LOWER VASCULAR LEFT COMMON FEMORAL COMPETENT: NORMAL
BH CV LOWER VASCULAR LEFT COMMON FEMORAL COMPRESS: NORMAL
BH CV LOWER VASCULAR LEFT COMMON FEMORAL PHASIC: NORMAL
BH CV LOWER VASCULAR LEFT COMMON FEMORAL SPONT: NORMAL
BH CV LOWER VASCULAR LEFT DISTAL FEMORAL COMPRESS: NORMAL
BH CV LOWER VASCULAR LEFT GASTRONEMIUS COMPRESS: NORMAL
BH CV LOWER VASCULAR LEFT GREATER SAPH AK COMPRESS: NORMAL
BH CV LOWER VASCULAR LEFT GREATER SAPH BK COMPRESS: NORMAL
BH CV LOWER VASCULAR LEFT LESSER SAPH COMPRESS: NORMAL
BH CV LOWER VASCULAR LEFT MID FEMORAL AUGMENT: NORMAL
BH CV LOWER VASCULAR LEFT MID FEMORAL COMPETENT: NORMAL
BH CV LOWER VASCULAR LEFT MID FEMORAL COMPRESS: NORMAL
BH CV LOWER VASCULAR LEFT MID FEMORAL PHASIC: NORMAL
BH CV LOWER VASCULAR LEFT MID FEMORAL SPONT: NORMAL
BH CV LOWER VASCULAR LEFT PERONEAL COMPRESS: NORMAL
BH CV LOWER VASCULAR LEFT POPLITEAL AUGMENT: NORMAL
BH CV LOWER VASCULAR LEFT POPLITEAL COMPETENT: NORMAL
BH CV LOWER VASCULAR LEFT POPLITEAL COMPRESS: NORMAL
BH CV LOWER VASCULAR LEFT POPLITEAL PHASIC: NORMAL
BH CV LOWER VASCULAR LEFT POPLITEAL SPONT: NORMAL
BH CV LOWER VASCULAR LEFT POSTERIOR TIBIAL COMPRESS: NORMAL
BH CV LOWER VASCULAR LEFT POSTERIOR TIBIAL THROMBUS: NORMAL
BH CV LOWER VASCULAR LEFT PROFUNDA FEMORAL COMPRESS: NORMAL
BH CV LOWER VASCULAR LEFT PROXIMAL FEMORAL COMPRESS: NORMAL
BH CV LOWER VASCULAR LEFT SAPHENOFEMORAL JUNCTION COMPRESS: NORMAL
BH CV LOWER VASCULAR RIGHT COMMON FEMORAL AUGMENT: NORMAL
BH CV LOWER VASCULAR RIGHT COMMON FEMORAL COMPETENT: NORMAL
BH CV LOWER VASCULAR RIGHT COMMON FEMORAL COMPRESS: NORMAL
BH CV LOWER VASCULAR RIGHT COMMON FEMORAL PHASIC: NORMAL
BH CV LOWER VASCULAR RIGHT COMMON FEMORAL SPONT: NORMAL

## 2023-07-25 PROCEDURE — 93971 EXTREMITY STUDY: CPT

## 2023-07-26 ENCOUNTER — TELEPHONE (OUTPATIENT)
Dept: ONCOLOGY | Facility: HOSPITAL | Age: 38
End: 2023-07-26

## 2023-07-26 NOTE — TELEPHONE ENCOUNTER
Phoned Ruddy to inform him of his doppler results :  Chronic left lower extremity deep vein thrombosis noted in the posterial tibial.    All other left sided veins appeared normal. Informed him to continue to take his Eliquis and I will let him know if he needs to keep the appt for his bilateral doppler in Sept.

## 2023-07-28 ENCOUNTER — TELEPHONE (OUTPATIENT)
Dept: ONCOLOGY | Facility: HOSPITAL | Age: 38
End: 2023-07-28

## 2023-07-28 NOTE — TELEPHONE ENCOUNTER
Phoned pt to inform him Dr. Javed wants him to keep his bilateral doppler appt scheduled in Sept. Unable to leave a  as his mail box was full.

## 2023-08-17 ENCOUNTER — TELEPHONE (OUTPATIENT)
Dept: ONCOLOGY | Facility: CLINIC | Age: 38
End: 2023-08-17

## 2023-08-17 NOTE — TELEPHONE ENCOUNTER
CALLED AND WAS UNABLE TO LEAVE A VOICEMAIL FOR THE PT, AS VOICEMAIL WAS FULL.  [facility] IS NOT IN FORCE AT THIS TIME. NO ACTIVE INSURANCE.

## 2023-08-17 NOTE — TELEPHONE ENCOUNTER
Called patient with regards to updating his appointment, his venous ultrasound was rescheduled to 11/15, will have blood tests drawn same day 11/15; then see dr jackson a week after 11/22. Will coordinate with  to reach out to patient for appointments update.  
show

## 2023-08-18 ENCOUNTER — TELEPHONE (OUTPATIENT)
Dept: ONCOLOGY | Facility: CLINIC | Age: 38
End: 2023-08-18

## 2023-08-22 ENCOUNTER — DOCUMENTATION (OUTPATIENT)
Dept: ONCOLOGY | Facility: CLINIC | Age: 38
End: 2023-08-22

## 2023-08-22 NOTE — PROGRESS NOTES
Patient's wife calls indicating that he has not been taking his anticoagulation sometime. The record indicates that he may be able to come off of anticoagulation but during the visit in March 2023 a decision has been made to continue it. At this point they plan a trip in the next 2 days and wonder if they may have samples to use for that travel. I have advised him that the office is closed and they will need to call back in the a.m. 8/23/2023 to answer the question.  His wife also indicates that he has had a Doppler recently but that is not seen in the record currently. Finally have suggested that he be seen back in the office in the near future to determine a subsequent plan for traveling including compressive stockings or prophylactic anticoagulation as needed. RENETTA

## 2023-08-23 ENCOUNTER — TELEPHONE (OUTPATIENT)
Dept: ONCOLOGY | Facility: CLINIC | Age: 38
End: 2023-08-23

## 2023-08-23 NOTE — TELEPHONE ENCOUNTER
Hi, this is Maureen Luu calling on behalf of my  Ruddy Luu. We are out of his Eliquis, and he has not had it in a a couple days, and they are having us pay 1700 dollars for it, so we cannot get it. We were told to call you to just see if there is anything or any samples that you could give us. We are going out of town, I am driving driving, so I did not want him on that. If you could just give me a call back, and we leave the day after tomorrow, so thank you so much. Have a great day.  08/22/2023 06:52 PM  This message was on [employer]

## 2023-08-23 NOTE — TELEPHONE ENCOUNTER
Called wife back to let her know we could do sample  today in office. She couldn't come until after office closes so I gave her my direct line for her  to call me to let me know what time is good for pickup. He called and requested a 1:30pm pickup. I sent scheduling a message to add him for a vital only appt and patient v/u.

## 2023-10-02 ENCOUNTER — TELEPHONE (OUTPATIENT)
Dept: ONCOLOGY | Facility: CLINIC | Age: 38
End: 2023-10-02
Payer: MEDICAID

## 2023-10-02 NOTE — TELEPHONE ENCOUNTER
Provider: Tello  Caller: patient  Relationship to Patient: self  Call Back Phone Number: [phone]  Reason for Call: Pt calling to see if he can get sample of Eliquis?

## 2023-10-02 NOTE — TELEPHONE ENCOUNTER
Called the patient and he stated he needed to come in today at 2pm for sample . He is working out his insurance and still does not have coverage but states he will be calling us back in the next week or so to schedule a f/u appt. I let scheduling know.

## 2023-11-08 ENCOUNTER — TELEPHONE (OUTPATIENT)
Dept: ONCOLOGY | Facility: CLINIC | Age: 38
End: 2023-11-08
Payer: MEDICAID

## 2023-11-08 NOTE — TELEPHONE ENCOUNTER
Provider: Dr. Noel Javed  Caller: Ruddy Luu  Relationship to Patient: Self  Call Back Phone Number: [phone]  Reason for Call: Pt requesting a refill of Eliquis

## 2023-11-08 NOTE — TELEPHONE ENCOUNTER
Called the patient as there was no appt made. I let him know we would have to have one made and we wuld not be able to continue giving samples if the next appt gets cancelled. He stated he was meeting with his medicaid officer Friday and that he would hopefully have active insurance then. Patient v/u. Scheduling notified.

## 2023-11-27 ENCOUNTER — TELEPHONE (OUTPATIENT)
Dept: ONCOLOGY | Facility: CLINIC | Age: 38
End: 2023-11-27

## 2024-02-20 ENCOUNTER — TELEPHONE (OUTPATIENT)
Dept: ONCOLOGY | Facility: CLINIC | Age: 39
End: 2024-02-20
Payer: MEDICAID

## 2024-03-08 ENCOUNTER — LAB (OUTPATIENT)
Dept: OTHER | Facility: HOSPITAL | Age: 39
End: 2024-03-08
Payer: COMMERCIAL

## 2024-03-08 ENCOUNTER — OFFICE VISIT (OUTPATIENT)
Dept: ONCOLOGY | Facility: CLINIC | Age: 39
End: 2024-03-08
Payer: COMMERCIAL

## 2024-03-08 VITALS
HEART RATE: 61 BPM | HEIGHT: 67 IN | RESPIRATION RATE: 18 BRPM | OXYGEN SATURATION: 97 % | WEIGHT: 259.6 LBS | DIASTOLIC BLOOD PRESSURE: 72 MMHG | BODY MASS INDEX: 40.74 KG/M2 | TEMPERATURE: 97.7 F | SYSTOLIC BLOOD PRESSURE: 126 MMHG

## 2024-03-08 DIAGNOSIS — I82.552 CHRONIC DEEP VEIN THROMBOSIS (DVT) OF LEFT PERONEAL VEIN: Primary | ICD-10-CM

## 2024-03-08 DIAGNOSIS — I82.552 CHRONIC DEEP VEIN THROMBOSIS (DVT) OF LEFT PERONEAL VEIN: ICD-10-CM

## 2024-03-08 DIAGNOSIS — M79.89 LEFT LEG SWELLING: ICD-10-CM

## 2024-03-08 LAB
ALBUMIN SERPL-MCNC: 4.4 G/DL (ref 3.5–5.2)
ALBUMIN/GLOB SERPL: 1.6 G/DL
ALP SERPL-CCNC: 82 U/L (ref 39–117)
ALT SERPL W P-5'-P-CCNC: 40 U/L (ref 1–41)
ANION GAP SERPL CALCULATED.3IONS-SCNC: 10.4 MMOL/L (ref 5–15)
AST SERPL-CCNC: 23 U/L (ref 1–40)
BASOPHILS # BLD AUTO: 0.04 10*3/MM3 (ref 0–0.2)
BASOPHILS NFR BLD AUTO: 0.5 % (ref 0–1.5)
BILIRUB SERPL-MCNC: 0.3 MG/DL (ref 0–1.2)
BUN SERPL-MCNC: 20 MG/DL (ref 6–20)
BUN/CREAT SERPL: 15.3 (ref 7–25)
CALCIUM SPEC-SCNC: 9.8 MG/DL (ref 8.6–10.5)
CHLORIDE SERPL-SCNC: 104 MMOL/L (ref 98–107)
CO2 SERPL-SCNC: 26.6 MMOL/L (ref 22–29)
CREAT SERPL-MCNC: 1.31 MG/DL (ref 0.76–1.27)
D DIMER PPP FEU-MCNC: <0.27 MCGFEU/ML (ref 0–0.5)
DEPRECATED RDW RBC AUTO: 38.7 FL (ref 37–54)
EGFRCR SERPLBLD CKD-EPI 2021: 71.5 ML/MIN/1.73
EOSINOPHIL # BLD AUTO: 0.16 10*3/MM3 (ref 0–0.4)
EOSINOPHIL NFR BLD AUTO: 2 % (ref 0.3–6.2)
ERYTHROCYTE [DISTWIDTH] IN BLOOD BY AUTOMATED COUNT: 12.7 % (ref 12.3–15.4)
GLOBULIN UR ELPH-MCNC: 2.8 GM/DL
GLUCOSE SERPL-MCNC: 107 MG/DL (ref 65–99)
HCT VFR BLD AUTO: 42 % (ref 37.5–51)
HGB BLD-MCNC: 14.3 G/DL (ref 13–17.7)
IMM GRANULOCYTES # BLD AUTO: 0.06 10*3/MM3 (ref 0–0.05)
IMM GRANULOCYTES NFR BLD AUTO: 0.7 % (ref 0–0.5)
LYMPHOCYTES # BLD AUTO: 3 10*3/MM3 (ref 0.7–3.1)
LYMPHOCYTES NFR BLD AUTO: 36.6 % (ref 19.6–45.3)
MCH RBC QN AUTO: 29.1 PG (ref 26.6–33)
MCHC RBC AUTO-ENTMCNC: 34 G/DL (ref 31.5–35.7)
MCV RBC AUTO: 85.4 FL (ref 79–97)
MONOCYTES # BLD AUTO: 0.59 10*3/MM3 (ref 0.1–0.9)
MONOCYTES NFR BLD AUTO: 7.2 % (ref 5–12)
NEUTROPHILS NFR BLD AUTO: 4.35 10*3/MM3 (ref 1.7–7)
NEUTROPHILS NFR BLD AUTO: 53 % (ref 42.7–76)
NRBC BLD AUTO-RTO: 0 /100 WBC (ref 0–0.2)
PLATELET # BLD AUTO: 284 10*3/MM3 (ref 140–450)
PMV BLD AUTO: 9.4 FL (ref 6–12)
POTASSIUM SERPL-SCNC: 3.8 MMOL/L (ref 3.5–5.2)
PROT SERPL-MCNC: 7.2 G/DL (ref 6–8.5)
RBC # BLD AUTO: 4.92 10*6/MM3 (ref 4.14–5.8)
SODIUM SERPL-SCNC: 141 MMOL/L (ref 136–145)
WBC NRBC COR # BLD AUTO: 8.2 10*3/MM3 (ref 3.4–10.8)

## 2024-03-08 PROCEDURE — 85379 FIBRIN DEGRADATION QUANT: CPT | Performed by: INTERNAL MEDICINE

## 2024-03-08 PROCEDURE — 80053 COMPREHEN METABOLIC PANEL: CPT | Performed by: INTERNAL MEDICINE

## 2024-03-08 PROCEDURE — 85025 COMPLETE CBC W/AUTO DIFF WBC: CPT | Performed by: INTERNAL MEDICINE

## 2024-03-08 PROCEDURE — 36415 COLL VENOUS BLD VENIPUNCTURE: CPT

## 2024-03-08 NOTE — PROGRESS NOTES
Subjective     REASON FOR FOLLOW UP: Left lower extremity calf vein DVT.  [facility] laboratory hypercoagulable workup    HISTORY OF PRESENT ILLNESS:  The patient is a 38 y.o. year old male who is a generally healthy young man with no chronic medical problems.  He presented to the emergency room on 4/1/2022 with pain in the left lower extremity.  He underwent a venous Doppler study acute DVT in the posterior tibial, gastrocnemius, and soleal veins.  He was started on Eliquis 10 mg p.o. twice daily x7 days to be followed by 5 mg twice daily thereafter.    He was seen for initial consult in our office 4/5/2022 to undergo a hypercoagulable work-up to try to explain the development of this clot.  He does have a family history of blood clots in his mother who had DVTs.  He has not had any significant injury or surgery on the leg.  He had recently traveled to [place] in a car but had gotten out every couple of hours.  He is not on any hormonal therapies.  He has no history of malignancy.    We reviewed the results of the hypercoagulable work-up and explained that we do not see any signs of an acquired or inherited thrombophilic defect based on these labs.  We will elected to continue Eliquis for another 3 months and see him back in the office with repeat Doppler study.     INTERVAL HISTORY:  Ruddy returns for follow-up today having last been seen in March 2023.  He tells me that he had a lapse in his insurance coverage and was unable to keep his last scheduled follow-up due to this insurance issue.    He briefly was off Eliquis during that time but was able to get samples from our office and was only off the medication very briefly.    He reports he is currently doing well and back on Eliquis.  He has been faithfully wearing his compression stocking on the left lower extremity.    His blood count in the office today is normal.  We also are checking a D-dimer which is still pending.  I told him we would order a Doppler  study next week and then have a video telemedicine visit to discuss the results.  History of Present Illness     Past Medical History:   Diagnosis Date    History of DVT (deep vein thrombosis) 04/2022    Left lower extremity, calf muscle vein    Infectious mononucleosis     No pertinent past medical history         No past surgical history on file.     Current Outpatient Medications on File Prior to Visit   Medication Sig Dispense Refill    Adderall XR 20 MG 24 hr capsule Take 1 capsule by mouth Daily      apixaban (Eliquis) 5 MG tablet tablet Take 1 tablet by mouth Every 12 (Twelve) Hours. 56 tablet 0    buPROPion XL (WELLBUTRIN XL) 300 MG 24 hr tablet       zolpidem (AMBIEN) 10 MG tablet        No current facility-administered medications on file prior to visit.        ALLERGIES:    Allergies   Allergen Reactions    Penicillins Other (See Comments)     Unknown, allergic as a child        Social History     Socioeconomic History    Marital status:    Tobacco Use    Smoking status: Never    Smokeless tobacco: Never   Substance and Sexual Activity    Alcohol use: Yes     Comment: rarely    Drug use: Never        No family history on file.     Review of Systems   Constitutional:  Positive for activity change. Negative for chills, fatigue and fever.   HENT:  Negative for mouth sores, trouble swallowing and voice change.    Eyes:  Negative for pain and visual disturbance.   Respiratory:  Negative for cough, shortness of breath and wheezing.    Cardiovascular:  Negative for chest pain and palpitations.   Gastrointestinal:  Negative for abdominal pain, constipation, diarrhea, nausea and vomiting.   Genitourinary:  Negative for difficulty urinating, frequency and urgency.   Musculoskeletal:  Negative for arthralgias and joint swelling.        Left calf pain and swelling   Skin:  Negative for rash.   Neurological:  Negative for dizziness, seizures, weakness and headaches.   Hematological:  Negative for adenopathy.  "Does not bruise/bleed easily.   Psychiatric/Behavioral:  Negative for behavioral problems and confusion. The patient is not nervous/anxious.         Objective     Vitals:    03/08/24 1626   BP: 126/72   Pulse: 61   Resp: 18   Temp: 97.7 °F (36.5 °C)   TempSrc: Temporal   SpO2: 97%   Weight: 118 kg (259 lb 9.6 oz)   Height: 170.2 cm (67.01\")   PainSc: 0-No pain           3/8/2024     4:17 PM   Current Status   ECOG score 0       Physical Exam  Constitutional:       General: He is not in acute distress.     Appearance: He is well-developed.   HENT:      Head: Normocephalic.   Eyes:      General: No scleral icterus.     Conjunctiva/sclera: Conjunctivae normal.      Pupils: Pupils are equal, round, and reactive to light.   Neck:      Thyroid: No thyromegaly.      Vascular: No JVD.   Cardiovascular:      Rate and Rhythm: Normal rate and regular rhythm.      Heart sounds: No murmur heard.     No friction rub. No gallop.   Pulmonary:      Effort: Pulmonary effort is normal.      Breath sounds: Normal breath sounds. No wheezing or rales.   Abdominal:      General: There is no distension.      Palpations: Abdomen is soft. There is no mass.      Tenderness: There is no abdominal tenderness.   Musculoskeletal:         General: No deformity. Normal range of motion.      Cervical back: Normal range of motion and neck supple.   Lymphadenopathy:      Cervical: No cervical adenopathy.   Skin:     General: Skin is warm and dry.      Findings: No erythema or rash.   Neurological:      Mental Status: He is alert and oriented to person, place, and time.      Cranial Nerves: No cranial nerve deficit.      Deep Tendon Reflexes: Reflexes are normal and symmetric.   Psychiatric:         Behavior: Behavior normal.         Judgment: Judgment normal.           RECENT LABS:  Hematology WBC   Date Value Ref Range Status   03/08/2024 8.20 3.40 - 10.80 10*3/mm3 Final   04/30/2021 6.24 3.40 - 10.80 10*3/mm3 Final     RBC   Date Value Ref Range " Status   03/08/2024 4.92 4.14 - 5.80 10*6/mm3 Final   04/30/2021 4.84 4.14 - 5.80 10*6/mm3 Final     Hemoglobin   Date Value Ref Range Status   03/08/2024 14.3 13.0 - 17.7 g/dL Final     Hematocrit   Date Value Ref Range Status   03/08/2024 42.0 37.5 - 51.0 % Final     Platelets   Date Value Ref Range Status   03/08/2024 284 140 - 450 10*3/mm3 Final          11/15/2022  D-Dimer, Quantitative   0.00 - 0.49 MCGFEU/mL <0.27      DOPPLER 7/25/2023  Interpretation Summary     Chronic left lower extremity deep vein thrombosis noted in the posterial tibial.    All other left sided veins appeared normal.      DOPPLER 3/22/2023  Interpretation Summary         Chronic right lower extremity deep vein thrombosis noted in the posterior tibial.    Chronic left lower extremity deep vein thrombosis noted in the posterial tibial.    All other veins appeared normal bilaterally.      DOPPLER 11/15/2022  Interpretation Summary         Chronic right lower extremity deep vein thrombosis noted in the posterior tibial and gastrocnemius.    Chronic left lower extremity deep vein thrombosis noted in the posterial tibial, gastrocnemius and soleal.    All other veins appeared normal bilaterally.    DOPPLER 7/26/2022  Interpretation Summary    Chronic left lower extremity deep vein thrombosis noted in the posterial tibial, gastrocnemius and soleal.  All other veins appeared normal bilaterally.    VENOUS DOPPLER 4/1/2022  Interpretation Summary    Acute left lower extremity deep vein thrombosis noted in the posterial tibial, gastrocnemius and soleal.  All other left sided veins appeared normal.        Assessment & Plan   1.  Acute left calf DVT and is otherwise healthy 36-year-old gentleman with no obvious predisposing risk factors other than moderate obesity.  2.  Family history of DVTs in his mother.  3.  Laboratory hypercoagulable work-up was unremarkable with no evidence of inherited or acquired thrombophilic defects.  4.  Latest Doppler  7/25/2023 showed chronic DVT in the calf.  Patient's symptoms have improved and he never had any symptoms of clot in the right lower extremity.    PLAN  1.  We recommended the patient continue to wear his compression stockings faithfully.  This has made a difference in his discomfort and swelling in the leg.  2.  We will schedule an outpatient Doppler of the lower extremities bilaterally next week to assess whether or not he still has chronic clot in the calf.  3.  For now he will continue on Eliquis  4.  We will have a video telemedicine visit after the Doppler and will discuss with him at that time whether or not he could potentially come off Eliquis.

## 2024-03-14 ENCOUNTER — HOSPITAL ENCOUNTER (OUTPATIENT)
Dept: CARDIOLOGY | Facility: HOSPITAL | Age: 39
Discharge: HOME | End: 2024-03-14
Admitting: INTERNAL MEDICINE
Payer: COMMERCIAL

## 2024-03-14 DIAGNOSIS — I82.552 CHRONIC DEEP VEIN THROMBOSIS (DVT) OF LEFT PERONEAL VEIN: ICD-10-CM

## 2024-03-14 DIAGNOSIS — M79.89 LEFT LEG SWELLING: ICD-10-CM

## 2024-03-14 LAB
BH CV LOW VAS LEFT POSTERIOR TIBIAL VESSEL: 1
BH CV LOWER VASCULAR LEFT COMMON FEMORAL AUGMENT: NORMAL
BH CV LOWER VASCULAR LEFT COMMON FEMORAL COMPETENT: NORMAL
BH CV LOWER VASCULAR LEFT COMMON FEMORAL COMPRESS: NORMAL
BH CV LOWER VASCULAR LEFT COMMON FEMORAL PHASIC: NORMAL
BH CV LOWER VASCULAR LEFT COMMON FEMORAL SPONT: NORMAL
BH CV LOWER VASCULAR LEFT DISTAL FEMORAL COMPRESS: NORMAL
BH CV LOWER VASCULAR LEFT GASTRONEMIUS COMPRESS: NORMAL
BH CV LOWER VASCULAR LEFT GREATER SAPH AK COMPRESS: NORMAL
BH CV LOWER VASCULAR LEFT GREATER SAPH BK COMPRESS: NORMAL
BH CV LOWER VASCULAR LEFT LESSER SAPH COMPRESS: NORMAL
BH CV LOWER VASCULAR LEFT MID FEMORAL AUGMENT: NORMAL
BH CV LOWER VASCULAR LEFT MID FEMORAL COMPETENT: NORMAL
BH CV LOWER VASCULAR LEFT MID FEMORAL COMPRESS: NORMAL
BH CV LOWER VASCULAR LEFT MID FEMORAL PHASIC: NORMAL
BH CV LOWER VASCULAR LEFT MID FEMORAL SPONT: NORMAL
BH CV LOWER VASCULAR LEFT PERONEAL COMPRESS: NORMAL
BH CV LOWER VASCULAR LEFT POPLITEAL AUGMENT: NORMAL
BH CV LOWER VASCULAR LEFT POPLITEAL COMPETENT: NORMAL
BH CV LOWER VASCULAR LEFT POPLITEAL COMPRESS: NORMAL
BH CV LOWER VASCULAR LEFT POPLITEAL PHASIC: NORMAL
BH CV LOWER VASCULAR LEFT POPLITEAL SPONT: NORMAL
BH CV LOWER VASCULAR LEFT POSTERIOR TIBIAL COMPRESS: NORMAL
BH CV LOWER VASCULAR LEFT POSTERIOR TIBIAL THROMBUS: NORMAL
BH CV LOWER VASCULAR LEFT PROFUNDA FEMORAL COMPRESS: NORMAL
BH CV LOWER VASCULAR LEFT PROXIMAL FEMORAL COMPRESS: NORMAL
BH CV LOWER VASCULAR LEFT SAPHENOFEMORAL JUNCTION COMPRESS: NORMAL
BH CV LOWER VASCULAR RIGHT COMMON FEMORAL AUGMENT: NORMAL
BH CV LOWER VASCULAR RIGHT COMMON FEMORAL COMPETENT: NORMAL
BH CV LOWER VASCULAR RIGHT COMMON FEMORAL COMPRESS: NORMAL
BH CV LOWER VASCULAR RIGHT COMMON FEMORAL PHASIC: NORMAL
BH CV LOWER VASCULAR RIGHT COMMON FEMORAL SPONT: NORMAL
BH CV LOWER VASCULAR RIGHT DISTAL FEMORAL COMPRESS: NORMAL
BH CV LOWER VASCULAR RIGHT GASTRONEMIUS COMPRESS: NORMAL
BH CV LOWER VASCULAR RIGHT GREATER SAPH AK COMPRESS: NORMAL
BH CV LOWER VASCULAR RIGHT GREATER SAPH BK COMPRESS: NORMAL
BH CV LOWER VASCULAR RIGHT LESSER SAPH COMPRESS: NORMAL
BH CV LOWER VASCULAR RIGHT MID FEMORAL AUGMENT: NORMAL
BH CV LOWER VASCULAR RIGHT MID FEMORAL COMPETENT: NORMAL
BH CV LOWER VASCULAR RIGHT MID FEMORAL COMPRESS: NORMAL
BH CV LOWER VASCULAR RIGHT MID FEMORAL PHASIC: NORMAL
BH CV LOWER VASCULAR RIGHT MID FEMORAL SPONT: NORMAL
BH CV LOWER VASCULAR RIGHT PERONEAL COMPRESS: NORMAL
BH CV LOWER VASCULAR RIGHT POPLITEAL AUGMENT: NORMAL
BH CV LOWER VASCULAR RIGHT POPLITEAL COMPETENT: NORMAL
BH CV LOWER VASCULAR RIGHT POPLITEAL COMPRESS: NORMAL
BH CV LOWER VASCULAR RIGHT POPLITEAL PHASIC: NORMAL
BH CV LOWER VASCULAR RIGHT POPLITEAL SPONT: NORMAL
BH CV LOWER VASCULAR RIGHT POSTERIOR TIBIAL COMPRESS: NORMAL
BH CV LOWER VASCULAR RIGHT PROFUNDA FEMORAL COMPRESS: NORMAL
BH CV LOWER VASCULAR RIGHT PROXIMAL FEMORAL COMPRESS: NORMAL
BH CV LOWER VASCULAR RIGHT SAPHENOFEMORAL JUNCTION COMPRESS: NORMAL

## 2024-03-14 PROCEDURE — 93970 EXTREMITY STUDY: CPT

## 2024-03-22 ENCOUNTER — TELEMEDICINE (OUTPATIENT)
Dept: ONCOLOGY | Facility: CLINIC | Age: 39
End: 2024-03-22
Payer: COMMERCIAL

## 2024-03-22 ENCOUNTER — OFFICE VISIT (OUTPATIENT)
Dept: FAMILY MEDICINE CLINIC | Facility: CLINIC | Age: 39
End: 2024-03-22
Payer: COMMERCIAL

## 2024-03-22 VITALS
OXYGEN SATURATION: 96 % | HEART RATE: 52 BPM | HEIGHT: 67 IN | DIASTOLIC BLOOD PRESSURE: 88 MMHG | RESPIRATION RATE: 16 BRPM | WEIGHT: 264 LBS | BODY MASS INDEX: 41.44 KG/M2 | SYSTOLIC BLOOD PRESSURE: 140 MMHG

## 2024-03-22 DIAGNOSIS — I82.552 CHRONIC DEEP VEIN THROMBOSIS (DVT) OF LEFT PERONEAL VEIN: Primary | ICD-10-CM

## 2024-03-22 DIAGNOSIS — R03.0 ELEVATED BLOOD PRESSURE READING WITHOUT DIAGNOSIS OF HYPERTENSION: ICD-10-CM

## 2024-03-22 DIAGNOSIS — M79.89 LEFT LEG SWELLING: ICD-10-CM

## 2024-03-22 DIAGNOSIS — I82.562 CHRONIC DEEP VEIN THROMBOSIS (DVT) OF CALF MUSCLE VEIN OF LEFT LOWER EXTREMITY: ICD-10-CM

## 2024-03-22 DIAGNOSIS — Z71.3 WEIGHT LOSS COUNSELING, ENCOUNTER FOR: Primary | ICD-10-CM

## 2024-03-22 NOTE — PROGRESS NOTES
Subjective   This was an audio and video enabled telemedicine encounter.    REASON FOR FOLLOW UP: Left lower extremity calf vein DVT.  [facility] laboratory hypercoagulable workup    HISTORY OF PRESENT ILLNESS:  The patient is a 38 y.o. year old male who is a generally healthy young man with no chronic medical problems.  He presented to the emergency room on 4/1/2022 with pain in the left lower extremity.  He underwent a venous Doppler study acute DVT in the posterior tibial, gastrocnemius, and soleal veins.  He was started on Eliquis 10 mg p.o. twice daily x7 days to be followed by 5 mg twice daily thereafter.    He was seen for initial consult in our office 4/5/2022 to undergo a hypercoagulable work-up to try to explain the development of this clot.  He does have a family history of blood clots in his mother who had DVTs.  He has not had any significant injury or surgery on the leg.  He had recently traveled to [place] in a car but had gotten out every couple of hours.  He is not on any hormonal therapies.  He has no history of malignancy.    We reviewed the results of the hypercoagulable work-up and explained that we do not see any signs of an acquired or inherited thrombophilic defect based on these labs.  We will elected to continue Eliquis for another 3 months and see him back in the office with repeat Doppler study.     Ruddy had a lapse in his insurance coverage and was unable to keep his previous scheduled follow-up due to this insurance issue.    He briefly was off Eliquis during that time but was able to get samples from our office and was only off the medication very briefly.    He reports he is currently doing well and back on Eliquis.  He has been faithfully wearing his compression stocking on the left lower extremity.    INTERVAL HISTORY:  On his visit here 2 weeks ago we discussed whether or not to discontinue Eliquis since he has been on the medication for 2 years now.  We elected to check  another Doppler and additional labs and then have a telemedicine visit with him today to review the results and make a decision.    Singh underwent a Doppler study on 3/14/2024 showing chronic clot in the posterior tibial vein only in the left leg.  His D-dimer remains negative at less than 0.27.    After reviewing these results and discussing the pros and cons of anticoagulation we elected to take him off Eliquis at this point.    He requested 1 additional follow-up visit in our office in 6 months with a repeat Doppler and labs to be sure that he is doing well off Eliquis therefore we will schedule this follow-up in September.      History of Present Illness     Past Medical History:   Diagnosis Date    History of DVT (deep vein thrombosis) 04/2022    Left lower extremity, calf muscle vein    Infectious mononucleosis     No pertinent past medical history         No past surgical history on file.     Current Outpatient Medications on File Prior to Visit   Medication Sig Dispense Refill    Adderall XR 20 MG 24 hr capsule Take 1 capsule by mouth Daily      apixaban (Eliquis) 5 MG tablet tablet Take 1 tablet by mouth Every 12 (Twelve) Hours. 56 tablet 0    Semaglutide-Weight Management 0.25 MG/0.5ML solution auto-injector Inject 0.5 mL under the skin into the appropriate area as directed 1 (One) Time Per Week. 2 mL 1    zolpidem (AMBIEN) 10 MG tablet       [DISCONTINUED] buPROPion XL (WELLBUTRIN XL) 300 MG 24 hr tablet  (Patient not taking: Reported on 3/22/2024)       No current facility-administered medications on file prior to visit.        ALLERGIES:    Allergies   Allergen Reactions    Penicillins Other (See Comments)     Unknown, allergic as a child        Social History     Socioeconomic History    Marital status:      Spouse name: Maureen   Tobacco Use    Smoking status: Never    Smokeless tobacco: Never   Substance and Sexual Activity    Alcohol use: Yes     Comment: rarely    Drug use: Never        No  family history on file.     Review of Systems   Constitutional:  Positive for activity change. Negative for chills, fatigue and fever.   HENT:  Negative for mouth sores, trouble swallowing and voice change.    Eyes:  Negative for pain and visual disturbance.   Respiratory:  Negative for cough, shortness of breath and wheezing.    Cardiovascular:  Negative for chest pain and palpitations.   Gastrointestinal:  Negative for abdominal pain, constipation, diarrhea, nausea and vomiting.   Genitourinary:  Negative for difficulty urinating, frequency and urgency.   Musculoskeletal:  Negative for arthralgias and joint swelling.        Left calf pain and swelling   Skin:  Negative for rash.   Neurological:  Negative for dizziness, seizures, weakness and headaches.   Hematological:  Negative for adenopathy. Does not bruise/bleed easily.   Psychiatric/Behavioral:  Negative for behavioral problems and confusion. The patient is not nervous/anxious.         Objective     There were no vitals filed for this visit.          3/8/2024     4:17 PM   Current Status   ECOG score 0     Physical exam performed on today's visit as this was a video and audio telemedicine visit.  Physical Exam  Constitutional:       General: He is not in acute distress.     Appearance: He is well-developed.   HENT:      Head: Normocephalic.   Eyes:      General: No scleral icterus.     Conjunctiva/sclera: Conjunctivae normal.      Pupils: Pupils are equal, round, and reactive to light.   Neck:      Thyroid: No thyromegaly.      Vascular: No JVD.   Cardiovascular:      Rate and Rhythm: Normal rate and regular rhythm.      Heart sounds: No murmur heard.     No friction rub. No gallop.   Pulmonary:      Effort: Pulmonary effort is normal.      Breath sounds: Normal breath sounds. No wheezing or rales.   Abdominal:      General: There is no distension.      Palpations: Abdomen is soft. There is no mass.      Tenderness: There is no abdominal tenderness.    Musculoskeletal:         General: No deformity. Normal range of motion.      Cervical back: Normal range of motion and neck supple.   Lymphadenopathy:      Cervical: No cervical adenopathy.   Skin:     General: Skin is warm and dry.      Findings: No erythema or rash.   Neurological:      Mental Status: He is alert and oriented to person, place, and time.      Cranial Nerves: No cranial nerve deficit.      Deep Tendon Reflexes: Reflexes are normal and symmetric.   Psychiatric:         Behavior: Behavior normal.         Judgment: Judgment normal.           RECENT LABS:  Hematology WBC   Date Value Ref Range Status   03/08/2024 8.20 3.40 - 10.80 10*3/mm3 Final   04/30/2021 6.24 3.40 - 10.80 10*3/mm3 Final     RBC   Date Value Ref Range Status   03/08/2024 4.92 4.14 - 5.80 10*6/mm3 Final   04/30/2021 4.84 4.14 - 5.80 10*6/mm3 Final     Hemoglobin   Date Value Ref Range Status   03/08/2024 14.3 13.0 - 17.7 g/dL Final     Hematocrit   Date Value Ref Range Status   03/08/2024 42.0 37.5 - 51.0 % Final     Platelets   Date Value Ref Range Status   03/08/2024 284 140 - 450 10*3/mm3 Final            3/8/2024  D-Dimer, Quantitative  0.00 - 0.50 MCGFEU/mL <0.27       11/15/2022  D-Dimer, Quantitative   0.00 - 0.49 MCGFEU/mL <0.27        DOPPLER 3/14/2024  Interpretation Summary     Chronic left lower extremity deep vein thrombosis noted in the posterial tibial.    All other veins appeared normal bilaterally.    DOPPLER 7/25/2023  Interpretation Summary     Chronic left lower extremity deep vein thrombosis noted in the posterial tibial.    All other left sided veins appeared normal.      DOPPLER 3/22/2023  Interpretation Summary         Chronic right lower extremity deep vein thrombosis noted in the posterior tibial.    Chronic left lower extremity deep vein thrombosis noted in the posterial tibial.    All other veins appeared normal bilaterally.      DOPPLER 11/15/2022  Interpretation Summary         Chronic right lower  extremity deep vein thrombosis noted in the posterior tibial and gastrocnemius.    Chronic left lower extremity deep vein thrombosis noted in the posterial tibial, gastrocnemius and soleal.    All other veins appeared normal bilaterally.    DOPPLER 7/26/2022  Interpretation Summary    Chronic left lower extremity deep vein thrombosis noted in the posterial tibial, gastrocnemius and soleal.  All other veins appeared normal bilaterally.    VENOUS DOPPLER 4/1/2022  Interpretation Summary    Acute left lower extremity deep vein thrombosis noted in the posterial tibial, gastrocnemius and soleal.  All other left sided veins appeared normal.      Assessment & Plan   1.  Acute left calf DVT and is otherwise healthy 36-year-old gentleman with no obvious predisposing risk factors other than moderate obesity.  2.  Family history of DVTs in his mother.  3.  Laboratory hypercoagulable work-up was unremarkable with no evidence of inherited or acquired thrombophilic defects.  4.  Latest Doppler 3/14/2024 showed chronic DVT in the calf.  Patient's symptoms have improved and he never had any symptoms of clot in the right lower extremity.    PLAN  1.  We recommended the patient continue to wear his compression stockings faithfully.  This has made a difference in his discomfort and swelling in the leg.  2.  We reviewed the results of the lab and Doppler study from 3/14/2024 and have recommended that he discontinue Eliquis at this point.  3.  He requested that we see him back in the office at least 1 more time to be sure that he is doing well off anticoagulation therefore we will schedule the 6-month MD follow-up with lab and repeat venous Doppler of the lower extremities performed 1 week prior to that visit.  4.  If he is doing well on the next visit we likely could see him on an as-needed basis only.

## 2024-03-22 NOTE — PROGRESS NOTES
Subjective   Ruddy Luu is a 38 y.o. male.     History of Present Illness   Ruddy Luu 38 y.o. male who presents today for a new patient appointment.    he has a history of   Patient Active Problem List   Diagnosis    Chronic deep vein thrombosis (DVT) of left peroneal vein   .  he is here to establish care I reviewed the PFSH recorded today by my MA/LPN staff.   he   He has been feeling well.      Preventative counseling provided and diet/exercise and vaccinations.       He had DVT in left calf and has seen hematology for hypercoagulable workup which was negative.  He has venous duplex scheduled to recheck and will remain on Eliquis until after.        He is due for routine labs, but is not fasting today.      He is interested in medication for weight loss.     The following portions of the patient's history were reviewed and updated as appropriate: allergies, current medications, past family history, past medical history, past social history, past surgical history, and problem list.    Review of Systems   Constitutional:  Negative for fatigue.   Respiratory:  Negative for cough and shortness of breath.    Cardiovascular:  Negative for chest pain and palpitations.   Skin:  Negative for rash.   Psychiatric/Behavioral:  Negative for dysphoric mood and sleep disturbance. The patient is not nervous/anxious.        Objective   Physical Exam  Vitals and nursing note reviewed.   Constitutional:       Appearance: He is well-developed.   Neck:      Vascular: No carotid bruit.   Cardiovascular:      Rate and Rhythm: Normal rate and regular rhythm.   Pulmonary:      Effort: Pulmonary effort is normal.      Breath sounds: Normal breath sounds.   Neurological:      Mental Status: He is alert and oriented to person, place, and time.   Psychiatric:         Mood and Affect: Mood normal.         Behavior: Behavior normal.         Thought Content: Thought content normal.         Judgment: Judgment normal.          Assessment & Plan   Diagnoses and all orders for this visit:    1. Weight loss counseling, encounter for (Primary)    2. Body mass index (BMI) of 40.0 to 44.9 in adult  -     Semaglutide-Weight Management 0.25 MG/0.5ML solution auto-injector; Inject 0.5 mL under the skin into the appropriate area as directed 1 (One) Time Per Week.  Dispense: 2 mL; Refill: 1    3. Chronic deep vein thrombosis (DVT) of calf muscle vein of left lower extremity    4. Elevated blood pressure reading without diagnosis of hypertension  -     Cancel: Comprehensive metabolic panel  -     Cancel: Lipid panel  -     Cancel: CBC and Differential  -     Cancel: TSH  -     Cancel: Vitamin D 25 hydroxy  -     CBC and Differential  -     Comprehensive metabolic panel  -     Lipid panel  -     TSH  -     Vitamin D 25 hydroxy  -     Hemoglobin A1c

## 2024-04-02 DIAGNOSIS — R53.83 OTHER FATIGUE: Primary | ICD-10-CM

## 2024-04-22 ENCOUNTER — TELEPHONE (OUTPATIENT)
Dept: FAMILY MEDICINE CLINIC | Facility: CLINIC | Age: 39
End: 2024-04-22

## 2024-04-22 NOTE — TELEPHONE ENCOUNTER
Patient was informed that per Earline Brooks   He can increase dose if he is not having any issues.  I have sent new RX to the pharmacy   Patient verbalized understanding and stated he would comply and had no further question for our office

## 2024-04-22 NOTE — TELEPHONE ENCOUNTER
Patient started on Wegovy 0.25mg and just had his 4th shot. He is asking if he can have the 5th injection this Friday, same dose or does he need to bump up the dosage.  He is asking because he won't see you until next week.

## 2024-04-27 LAB
TESTOST FREE SERPL-MCNC: 8.3 PG/ML (ref 8.7–25.1)
TESTOST SERPL-MCNC: 368 NG/DL (ref 264–916)

## 2024-05-10 ENCOUNTER — OFFICE VISIT (OUTPATIENT)
Dept: FAMILY MEDICINE CLINIC | Facility: CLINIC | Age: 39
End: 2024-05-10
Payer: COMMERCIAL

## 2024-05-10 VITALS
RESPIRATION RATE: 16 BRPM | HEART RATE: 62 BPM | HEIGHT: 67 IN | SYSTOLIC BLOOD PRESSURE: 120 MMHG | BODY MASS INDEX: 40.65 KG/M2 | OXYGEN SATURATION: 97 % | DIASTOLIC BLOOD PRESSURE: 82 MMHG | WEIGHT: 259 LBS

## 2024-05-10 DIAGNOSIS — Z71.3 WEIGHT LOSS COUNSELING, ENCOUNTER FOR: Primary | ICD-10-CM

## 2024-05-10 DIAGNOSIS — R03.0 ELEVATED BLOOD PRESSURE READING IN OFFICE WITHOUT DIAGNOSIS OF HYPERTENSION: ICD-10-CM

## 2024-05-10 PROCEDURE — 1126F AMNT PAIN NOTED NONE PRSNT: CPT | Performed by: NURSE PRACTITIONER

## 2024-05-10 PROCEDURE — 1159F MED LIST DOCD IN RCRD: CPT | Performed by: NURSE PRACTITIONER

## 2024-05-10 PROCEDURE — 1160F RVW MEDS BY RX/DR IN RCRD: CPT | Performed by: NURSE PRACTITIONER

## 2024-05-10 PROCEDURE — 99213 OFFICE O/P EST LOW 20 MIN: CPT | Performed by: NURSE PRACTITIONER

## 2024-05-11 LAB
25(OH)D3+25(OH)D2 SERPL-MCNC: 37.3 NG/ML (ref 30–100)
ALBUMIN SERPL-MCNC: 4.8 G/DL (ref 3.5–5.2)
ALBUMIN/GLOB SERPL: 1.8 G/DL
ALP SERPL-CCNC: 74 U/L (ref 39–117)
ALT SERPL-CCNC: 43 U/L (ref 1–41)
AST SERPL-CCNC: 26 U/L (ref 1–40)
BASOPHILS # BLD AUTO: 0.05 10*3/MM3 (ref 0–0.2)
BASOPHILS NFR BLD AUTO: 0.6 % (ref 0–1.5)
BILIRUB SERPL-MCNC: 0.5 MG/DL (ref 0–1.2)
BUN SERPL-MCNC: 16 MG/DL (ref 6–20)
BUN/CREAT SERPL: 14.2 (ref 7–25)
CALCIUM SERPL-MCNC: 9.7 MG/DL (ref 8.6–10.5)
CHLORIDE SERPL-SCNC: 102 MMOL/L (ref 98–107)
CHOLEST SERPL-MCNC: 239 MG/DL (ref 0–200)
CO2 SERPL-SCNC: 25.1 MMOL/L (ref 22–29)
CREAT SERPL-MCNC: 1.13 MG/DL (ref 0.76–1.27)
EGFRCR SERPLBLD CKD-EPI 2021: 85.3 ML/MIN/1.73
EOSINOPHIL # BLD AUTO: 0.17 10*3/MM3 (ref 0–0.4)
EOSINOPHIL NFR BLD AUTO: 2 % (ref 0.3–6.2)
ERYTHROCYTE [DISTWIDTH] IN BLOOD BY AUTOMATED COUNT: 12.7 % (ref 12.3–15.4)
GLOBULIN SER CALC-MCNC: 2.6 GM/DL
GLUCOSE SERPL-MCNC: 87 MG/DL (ref 65–99)
HBA1C MFR BLD: 5.2 % (ref 4.8–5.6)
HCT VFR BLD AUTO: 47.3 % (ref 37.5–51)
HDLC SERPL-MCNC: 43 MG/DL (ref 40–60)
HGB BLD-MCNC: 15.9 G/DL (ref 13–17.7)
IMM GRANULOCYTES # BLD AUTO: 0.02 10*3/MM3 (ref 0–0.05)
IMM GRANULOCYTES NFR BLD AUTO: 0.2 % (ref 0–0.5)
LDLC SERPL CALC-MCNC: 160 MG/DL (ref 0–100)
LYMPHOCYTES # BLD AUTO: 3.04 10*3/MM3 (ref 0.7–3.1)
LYMPHOCYTES NFR BLD AUTO: 36.6 % (ref 19.6–45.3)
MCH RBC QN AUTO: 29.1 PG (ref 26.6–33)
MCHC RBC AUTO-ENTMCNC: 33.6 G/DL (ref 31.5–35.7)
MCV RBC AUTO: 86.5 FL (ref 79–97)
MONOCYTES # BLD AUTO: 0.49 10*3/MM3 (ref 0.1–0.9)
MONOCYTES NFR BLD AUTO: 5.9 % (ref 5–12)
NEUTROPHILS # BLD AUTO: 4.54 10*3/MM3 (ref 1.7–7)
NEUTROPHILS NFR BLD AUTO: 54.7 % (ref 42.7–76)
NRBC BLD AUTO-RTO: 0 /100 WBC (ref 0–0.2)
PLATELET # BLD AUTO: 303 10*3/MM3 (ref 140–450)
POTASSIUM SERPL-SCNC: 4.6 MMOL/L (ref 3.5–5.2)
PROT SERPL-MCNC: 7.4 G/DL (ref 6–8.5)
RBC # BLD AUTO: 5.47 10*6/MM3 (ref 4.14–5.8)
SODIUM SERPL-SCNC: 139 MMOL/L (ref 136–145)
TRIGL SERPL-MCNC: 194 MG/DL (ref 0–150)
TSH SERPL DL<=0.005 MIU/L-ACNC: 3.65 UIU/ML (ref 0.27–4.2)
VLDLC SERPL CALC-MCNC: 36 MG/DL (ref 5–40)
WBC # BLD AUTO: 8.31 10*3/MM3 (ref 3.4–10.8)

## 2024-08-19 ENCOUNTER — TELEPHONE (OUTPATIENT)
Dept: FAMILY MEDICINE CLINIC | Facility: CLINIC | Age: 39
End: 2024-08-19
Payer: COMMERCIAL

## 2024-08-19 NOTE — TELEPHONE ENCOUNTER
Silvana with [facility] called wanting to know if they can compound wegovy for pt. She said if ok, you can resend and put ok to compound or we can call back. Thanks.

## 2024-09-09 ENCOUNTER — TELEPHONE (OUTPATIENT)
Dept: ONCOLOGY | Facility: CLINIC | Age: 39
End: 2024-09-09
Payer: COMMERCIAL

## 2024-09-09 NOTE — TELEPHONE ENCOUNTER
Caller: WILMA    Relationship: [facility]    Best call back number:     [phone]     What is the best time to reach you: ANYTIME    Who are you requesting to speak with (clinical staff, provider,  specific staff member): NON-CLINICAL    What was the call regarding: NEEDS A PRE-AUTH FOR THE DUPLEX VENOUS SCHEDULED FOR . WAS ORDERED BY DR DEVINE.    CPT CODE 80062    THE ONE FROM [facility]  IN  - NOTHING CURRENT FOR  IN [facility] SITE.     NEEDS TO KNOW BY 3PM TOMORROW 09/10/24

## 2024-09-11 ENCOUNTER — HOSPITAL ENCOUNTER (OUTPATIENT)
Dept: CARDIOLOGY | Facility: HOSPITAL | Age: 39
Discharge: HOME | End: 2024-09-11
Admitting: INTERNAL MEDICINE
Payer: COMMERCIAL

## 2024-09-11 DIAGNOSIS — I82.552 CHRONIC DEEP VEIN THROMBOSIS (DVT) OF LEFT PERONEAL VEIN: ICD-10-CM

## 2024-09-11 DIAGNOSIS — M79.89 LEFT LEG SWELLING: ICD-10-CM

## 2024-09-11 LAB

## 2024-09-11 PROCEDURE — 93970 EXTREMITY STUDY: CPT | Performed by: SURGERY

## 2024-09-11 PROCEDURE — 93970 EXTREMITY STUDY: CPT

## 2024-09-16 ENCOUNTER — DOCUMENTATION (OUTPATIENT)
Dept: ONCOLOGY | Facility: CLINIC | Age: 39
End: 2024-09-16
Payer: COMMERCIAL

## 2024-09-16 ENCOUNTER — TELEPHONE (OUTPATIENT)
Dept: ONCOLOGY | Facility: CLINIC | Age: 39
End: 2024-09-16
Payer: COMMERCIAL

## 2024-09-20 ENCOUNTER — LAB (OUTPATIENT)
Dept: OTHER | Facility: HOSPITAL | Age: 39
End: 2024-09-20
Payer: COMMERCIAL

## 2024-09-20 ENCOUNTER — OFFICE VISIT (OUTPATIENT)
Dept: ONCOLOGY | Facility: CLINIC | Age: 39
End: 2024-09-20
Payer: COMMERCIAL

## 2024-09-20 VITALS
DIASTOLIC BLOOD PRESSURE: 87 MMHG | HEIGHT: 67 IN | OXYGEN SATURATION: 96 % | HEART RATE: 65 BPM | WEIGHT: 262.2 LBS | SYSTOLIC BLOOD PRESSURE: 156 MMHG | BODY MASS INDEX: 41.15 KG/M2 | RESPIRATION RATE: 16 BRPM | TEMPERATURE: 98.4 F

## 2024-09-20 DIAGNOSIS — I82.552 CHRONIC DEEP VEIN THROMBOSIS (DVT) OF LEFT PERONEAL VEIN: Primary | ICD-10-CM

## 2024-09-20 DIAGNOSIS — M79.89 LEFT LEG SWELLING: ICD-10-CM

## 2024-09-20 DIAGNOSIS — I82.552 CHRONIC DEEP VEIN THROMBOSIS (DVT) OF LEFT PERONEAL VEIN: ICD-10-CM

## 2024-09-20 LAB
BASOPHILS # BLD AUTO: 0.04 10*3/MM3 (ref 0–0.2)
BASOPHILS NFR BLD AUTO: 0.5 % (ref 0–1.5)
DEPRECATED RDW RBC AUTO: 35.7 FL (ref 37–54)
EOSINOPHIL # BLD AUTO: 0.08 10*3/MM3 (ref 0–0.4)
EOSINOPHIL NFR BLD AUTO: 1 % (ref 0.3–6.2)
ERYTHROCYTE [DISTWIDTH] IN BLOOD BY AUTOMATED COUNT: 11.8 % (ref 12.3–15.4)
HCT VFR BLD AUTO: 44.4 % (ref 37.5–51)
HGB BLD-MCNC: 15.2 G/DL (ref 13–17.7)
IMM GRANULOCYTES # BLD AUTO: 0.01 10*3/MM3 (ref 0–0.05)
IMM GRANULOCYTES NFR BLD AUTO: 0.1 % (ref 0–0.5)
LYMPHOCYTES # BLD AUTO: 2.52 10*3/MM3 (ref 0.7–3.1)
LYMPHOCYTES NFR BLD AUTO: 30.3 % (ref 19.6–45.3)
MCH RBC QN AUTO: 28.6 PG (ref 26.6–33)
MCHC RBC AUTO-ENTMCNC: 34.2 G/DL (ref 31.5–35.7)
MCV RBC AUTO: 83.6 FL (ref 79–97)
MONOCYTES # BLD AUTO: 0.48 10*3/MM3 (ref 0.1–0.9)
MONOCYTES NFR BLD AUTO: 5.8 % (ref 5–12)
NEUTROPHILS NFR BLD AUTO: 5.2 10*3/MM3 (ref 1.7–7)
NEUTROPHILS NFR BLD AUTO: 62.3 % (ref 42.7–76)
NRBC BLD AUTO-RTO: 0 /100 WBC (ref 0–0.2)
PLATELET # BLD AUTO: 282 10*3/MM3 (ref 140–450)
PMV BLD AUTO: 9.5 FL (ref 6–12)
RBC # BLD AUTO: 5.31 10*6/MM3 (ref 4.14–5.8)
WBC NRBC COR # BLD AUTO: 8.33 10*3/MM3 (ref 3.4–10.8)

## 2024-09-20 PROCEDURE — 85025 COMPLETE CBC W/AUTO DIFF WBC: CPT | Performed by: INTERNAL MEDICINE

## 2024-09-20 PROCEDURE — 36415 COLL VENOUS BLD VENIPUNCTURE: CPT

## 2024-12-20 ENCOUNTER — DOCUMENTATION (OUTPATIENT)
Dept: ONCOLOGY | Facility: CLINIC | Age: 39
End: 2024-12-20
Payer: COMMERCIAL

## 2024-12-20 ENCOUNTER — TELEPHONE (OUTPATIENT)
Dept: ONCOLOGY | Facility: CLINIC | Age: 39
End: 2024-12-20
Payer: COMMERCIAL

## 2024-12-20 DIAGNOSIS — I82.552 CHRONIC DEEP VEIN THROMBOSIS (DVT) OF LEFT PERONEAL VEIN: Primary | ICD-10-CM

## 2024-12-20 NOTE — TELEPHONE ENCOUNTER
Caller: Ruddy Luu    Relationship: Self    Best call back number: [phone]    What orders are you requesting (i.e. lab or imaging): DOPPLER     In what timeframe would the patient need to come in: ASAP    Where will you receive your lab/imaging services:         Requested Prescriptions:   Requested Prescriptions     Pending Prescriptions Disp Refills    apixaban (ELIQUIS) 2.5 MG tablet tablet 60 tablet 11     Sig: Take 1 tablet by mouth Every 12 (Twelve) Hours.        Pharmacy where request should be sent: [facility] [id number] [facility] [id number] [place] [address] - [phone] [facility] [phone] FX     Last office visit with prescribing clinician: 9/20/2024   Last telemedicine visit with prescribing clinician: Visit date not found   Next office visit with prescribing clinician: 9/26/2025     Additional details provided by patient: PT WILL LOSE HIS INS. AS OF 12/31/24 & IS NOT SURE WHEN HE WILL HAVE NEW INS. SO ASKING TO GET A DOPPLER BEFORE THE END OF THE YEAR & A HIGH QUANTITY OF REFILL FOR ELIQUIS.    Does the patient have less than a 3 day supply:  [] Yes  [x] No    Would you like a call back once the refill request has been completed: [] Yes [x] No    If the office needs to give you a call back, can they leave a voicemail: [] Yes [x] No

## 2024-12-20 NOTE — PROGRESS NOTES
It is fine with me to do a bilateral leg Doppler this month if that is what he is requesting.  Please arrange the Doppler sometime on a weekday early in the day so we will have office resources available if a new clot is found    Legally I cannot prescribe twice the recommended dose of a medicine so the medicine can be cut in half and last twice as long.  I did not think I could do this as I thought that would be considered insurance fraud.  I did call pharmacy to confirm and they agreed with me that we cannot legally do this.  I am sorry for this as I would like to help him out  but legally I cannot do that.  As per my note, it is reasonable to stop prophylactic dose Eliquis as well.      ===View-only below this line===  ----- Message -----  From: Jazzmine Tariq RN  Sent: 12/20/2024   1:43 PM EST  To: MD Dr. Messi Rizo II,     Patient is not going to have health insurance in 2025. He is requesting to have a doppler done prior to the end of the year as he states he still has daily pain and swelling and he would feel more comfortable with a doppler to ensure he does not have anything acute going on. (Last doppler was 09/2024).     He is also requesting that we send in eliquis 5mg for him to get double the supply with him splitting them in half for 2.5mg BID. I told him I would reach out to you and then get back with him.     Thank you  ----- Message -----  From: Dylan Quintana RN  Sent: 12/20/2024  10:58 AM EST  To: Jazzmine Tariq RN      ----- Message -----  From: Darline Lim RegSched Rep  Sent: 12/20/2024  10:56 AM EST  To: [facility]

## 2024-12-26 NOTE — TELEPHONE ENCOUNTER
Called the patient to let him know Dr. Swenson was agreeable to doing a bilateral LE doppler prior to the beginning of the year since he will be losing insurance coverage. Patient was appreciative and v/u. A 90 day supply of eliquis 2.5mg was sent into his pharmacy.

## 2025-03-31 RX ORDER — APIXABAN 2.5 MG/1
2.5 TABLET, FILM COATED ORAL EVERY 12 HOURS SCHEDULED
Qty: 180 TABLET | Refills: 0 | Status: SHIPPED | OUTPATIENT
Start: 2025-03-31

## 2025-04-03 RX ORDER — APIXABAN 2.5 MG/1
2.5 TABLET, FILM COATED ORAL EVERY 12 HOURS SCHEDULED
Qty: 180 TABLET | Refills: 0 | OUTPATIENT
Start: 2025-04-03

## 2025-04-07 ENCOUNTER — HOSPITAL ENCOUNTER (OUTPATIENT)
Dept: ULTRASOUND IMAGING | Facility: HOSPITAL | Age: 40
Discharge: HOME | End: 2025-04-07
Admitting: INTERNAL MEDICINE
Payer: COMMERCIAL

## 2025-04-07 DIAGNOSIS — I82.552 CHRONIC DEEP VEIN THROMBOSIS (DVT) OF LEFT PERONEAL VEIN: ICD-10-CM

## 2025-04-07 PROCEDURE — 93970 EXTREMITY STUDY: CPT

## 2025-04-07 RX ORDER — APIXABAN 2.5 MG/1
2.5 TABLET, FILM COATED ORAL EVERY 12 HOURS SCHEDULED
Qty: 180 TABLET | Refills: 0 | OUTPATIENT
Start: 2025-04-07

## 2025-08-05 ENCOUNTER — OFFICE VISIT (OUTPATIENT)
Dept: FAMILY MEDICINE CLINIC | Facility: CLINIC | Age: 40
End: 2025-08-05
Payer: COMMERCIAL

## 2025-08-05 VITALS
OXYGEN SATURATION: 95 % | WEIGHT: 286.4 LBS | SYSTOLIC BLOOD PRESSURE: 132 MMHG | DIASTOLIC BLOOD PRESSURE: 79 MMHG | HEART RATE: 68 BPM | TEMPERATURE: 98.4 F | BODY MASS INDEX: 44.95 KG/M2 | HEIGHT: 67 IN

## 2025-08-05 DIAGNOSIS — R00.2 PALPITATIONS: Primary | ICD-10-CM

## 2025-08-05 DIAGNOSIS — R60.0 BILATERAL LOWER EXTREMITY EDEMA: ICD-10-CM

## 2025-08-05 DIAGNOSIS — R94.31 ABNORMAL ECG: ICD-10-CM

## 2025-08-05 DIAGNOSIS — Z12.5 SCREENING FOR PROSTATE CANCER: ICD-10-CM

## 2025-08-05 DIAGNOSIS — R03.0 ELEVATED BLOOD PRESSURE READING WITHOUT DIAGNOSIS OF HYPERTENSION: ICD-10-CM

## 2025-08-05 PROCEDURE — 93000 ELECTROCARDIOGRAM COMPLETE: CPT | Performed by: NURSE PRACTITIONER

## 2025-08-05 PROCEDURE — 99214 OFFICE O/P EST MOD 30 MIN: CPT | Performed by: NURSE PRACTITIONER

## 2025-08-05 PROCEDURE — 1159F MED LIST DOCD IN RCRD: CPT | Performed by: NURSE PRACTITIONER

## 2025-08-05 PROCEDURE — 1126F AMNT PAIN NOTED NONE PRSNT: CPT | Performed by: NURSE PRACTITIONER

## 2025-08-05 PROCEDURE — 1160F RVW MEDS BY RX/DR IN RCRD: CPT | Performed by: NURSE PRACTITIONER

## 2025-08-15 LAB
25(OH)D3+25(OH)D2 SERPL-MCNC: 29.9 NG/ML (ref 30–100)
ALBUMIN SERPL-MCNC: 4.4 G/DL (ref 3.5–5.2)
ALBUMIN/GLOB SERPL: 1.8 G/DL
ALP SERPL-CCNC: 81 U/L (ref 39–117)
ALT SERPL-CCNC: 56 U/L (ref 1–41)
AST SERPL-CCNC: 36 U/L (ref 1–40)
BASOPHILS # BLD AUTO: 0.05 10*3/MM3 (ref 0–0.2)
BASOPHILS NFR BLD AUTO: 0.6 % (ref 0–1.5)
BILIRUB SERPL-MCNC: 0.4 MG/DL (ref 0–1.2)
BUN SERPL-MCNC: 16 MG/DL (ref 6–20)
BUN/CREAT SERPL: 13.9 (ref 7–25)
CALCIUM SERPL-MCNC: 9.5 MG/DL (ref 8.6–10.5)
CHLORIDE SERPL-SCNC: 106 MMOL/L (ref 98–107)
CHOLEST SERPL-MCNC: 206 MG/DL (ref 0–200)
CO2 SERPL-SCNC: 23.5 MMOL/L (ref 22–29)
CREAT SERPL-MCNC: 1.15 MG/DL (ref 0.76–1.27)
EGFRCR SERPLBLD CKD-EPI 2021: 83 ML/MIN/1.73
EOSINOPHIL # BLD AUTO: 0.21 10*3/MM3 (ref 0–0.4)
EOSINOPHIL NFR BLD AUTO: 2.6 % (ref 0.3–6.2)
ERYTHROCYTE [DISTWIDTH] IN BLOOD BY AUTOMATED COUNT: 13.2 % (ref 12.3–15.4)
GLOBULIN SER CALC-MCNC: 2.5 GM/DL
GLUCOSE SERPL-MCNC: 90 MG/DL (ref 65–99)
HBA1C MFR BLD: 5.4 % (ref 4.8–5.6)
HCT VFR BLD AUTO: 42.3 % (ref 37.5–51)
HDLC SERPL-MCNC: 37 MG/DL (ref 40–60)
HGB BLD-MCNC: 14.3 G/DL (ref 13–17.7)
IMM GRANULOCYTES # BLD AUTO: 0.02 10*3/MM3 (ref 0–0.05)
IMM GRANULOCYTES NFR BLD AUTO: 0.2 % (ref 0–0.5)
LDLC SERPL CALC-MCNC: 117 MG/DL (ref 0–100)
LYMPHOCYTES # BLD AUTO: 2.79 10*3/MM3 (ref 0.7–3.1)
LYMPHOCYTES NFR BLD AUTO: 34 % (ref 19.6–45.3)
MCH RBC QN AUTO: 29.2 PG (ref 26.6–33)
MCHC RBC AUTO-ENTMCNC: 33.8 G/DL (ref 31.5–35.7)
MCV RBC AUTO: 86.3 FL (ref 79–97)
MONOCYTES # BLD AUTO: 0.64 10*3/MM3 (ref 0.1–0.9)
MONOCYTES NFR BLD AUTO: 7.8 % (ref 5–12)
NEUTROPHILS # BLD AUTO: 4.49 10*3/MM3 (ref 1.7–7)
NEUTROPHILS NFR BLD AUTO: 54.8 % (ref 42.7–76)
NRBC BLD AUTO-RTO: 0 /100 WBC (ref 0–0.2)
PLATELET # BLD AUTO: 304 10*3/MM3 (ref 140–450)
POTASSIUM SERPL-SCNC: 4.9 MMOL/L (ref 3.5–5.2)
PROT SERPL-MCNC: 6.9 G/DL (ref 6–8.5)
PSA SERPL-MCNC: 1.07 NG/ML (ref 0–4)
RBC # BLD AUTO: 4.9 10*6/MM3 (ref 4.14–5.8)
SODIUM SERPL-SCNC: 139 MMOL/L (ref 136–145)
TRIGL SERPL-MCNC: 297 MG/DL (ref 0–150)
TSH SERPL DL<=0.005 MIU/L-ACNC: 4.63 UIU/ML (ref 0.27–4.2)
VLDLC SERPL CALC-MCNC: 52 MG/DL (ref 5–40)
WBC # BLD AUTO: 8.2 10*3/MM3 (ref 3.4–10.8)